# Patient Record
Sex: FEMALE | Race: BLACK OR AFRICAN AMERICAN | URBAN - METROPOLITAN AREA
[De-identification: names, ages, dates, MRNs, and addresses within clinical notes are randomized per-mention and may not be internally consistent; named-entity substitution may affect disease eponyms.]

---

## 2017-06-15 ENCOUNTER — EMERGENCY (EMERGENCY)
Facility: HOSPITAL | Age: 32
LOS: 0 days | Discharge: HOME | End: 2017-06-15
Admitting: INTERNAL MEDICINE

## 2017-06-15 DIAGNOSIS — J98.59 OTHER DISEASES OF MEDIASTINUM, NOT ELSEWHERE CLASSIFIED: ICD-10-CM

## 2017-06-26 PROBLEM — Z00.00 ENCOUNTER FOR PREVENTIVE HEALTH EXAMINATION: Status: ACTIVE | Noted: 2017-06-26

## 2017-06-28 DIAGNOSIS — M25.562 PAIN IN LEFT KNEE: ICD-10-CM

## 2017-06-28 DIAGNOSIS — M25.541 PAIN IN JOINTS OF RIGHT HAND: ICD-10-CM

## 2017-06-28 DIAGNOSIS — Z98.890 OTHER SPECIFIED POSTPROCEDURAL STATES: ICD-10-CM

## 2017-06-28 DIAGNOSIS — M25.512 PAIN IN LEFT SHOULDER: ICD-10-CM

## 2017-06-28 DIAGNOSIS — M25.542 PAIN IN JOINTS OF LEFT HAND: ICD-10-CM

## 2017-06-28 DIAGNOSIS — M25.561 PAIN IN RIGHT KNEE: ICD-10-CM

## 2017-09-13 ENCOUNTER — APPOINTMENT (OUTPATIENT)
Dept: RHEUMATOLOGY | Facility: CLINIC | Age: 32
End: 2017-09-13

## 2018-08-15 ENCOUNTER — EMERGENCY (EMERGENCY)
Facility: HOSPITAL | Age: 33
LOS: 0 days | Discharge: HOME | End: 2018-08-15
Attending: EMERGENCY MEDICINE | Admitting: EMERGENCY MEDICINE

## 2018-08-15 VITALS
OXYGEN SATURATION: 100 % | TEMPERATURE: 98 F | HEART RATE: 85 BPM | RESPIRATION RATE: 18 BRPM | SYSTOLIC BLOOD PRESSURE: 133 MMHG | DIASTOLIC BLOOD PRESSURE: 56 MMHG

## 2018-08-15 DIAGNOSIS — G43.909 MIGRAINE, UNSPECIFIED, NOT INTRACTABLE, WITHOUT STATUS MIGRAINOSUS: ICD-10-CM

## 2018-08-15 DIAGNOSIS — R51 HEADACHE: ICD-10-CM

## 2018-08-15 RX ORDER — SODIUM CHLORIDE 9 MG/ML
3 INJECTION INTRAMUSCULAR; INTRAVENOUS; SUBCUTANEOUS ONCE
Qty: 0 | Refills: 0 | Status: COMPLETED | OUTPATIENT
Start: 2018-08-15 | End: 2018-08-15

## 2018-08-15 RX ORDER — METOCLOPRAMIDE HCL 10 MG
1 TABLET ORAL
Qty: 20 | Refills: 0
Start: 2018-08-15 | End: 2018-08-19

## 2018-08-15 RX ORDER — KETOROLAC TROMETHAMINE 30 MG/ML
30 SYRINGE (ML) INJECTION ONCE
Qty: 0 | Refills: 0 | Status: DISCONTINUED | OUTPATIENT
Start: 2018-08-15 | End: 2018-08-15

## 2018-08-15 RX ORDER — METOCLOPRAMIDE HCL 10 MG
10 TABLET ORAL ONCE
Qty: 0 | Refills: 0 | Status: COMPLETED | OUTPATIENT
Start: 2018-08-15 | End: 2018-08-15

## 2018-08-15 RX ORDER — SODIUM CHLORIDE 9 MG/ML
1000 INJECTION INTRAMUSCULAR; INTRAVENOUS; SUBCUTANEOUS ONCE
Qty: 0 | Refills: 0 | Status: COMPLETED | OUTPATIENT
Start: 2018-08-15 | End: 2018-08-15

## 2018-08-15 RX ADMIN — Medication 10 MILLIGRAM(S): at 12:31

## 2018-08-15 RX ADMIN — SODIUM CHLORIDE 1000 MILLILITER(S): 9 INJECTION INTRAMUSCULAR; INTRAVENOUS; SUBCUTANEOUS at 12:31

## 2018-08-15 RX ADMIN — Medication 30 MILLIGRAM(S): at 12:31

## 2018-08-15 RX ADMIN — SODIUM CHLORIDE 3 MILLILITER(S): 9 INJECTION INTRAMUSCULAR; INTRAVENOUS; SUBCUTANEOUS at 12:20

## 2018-08-15 NOTE — ED PROVIDER NOTE - MEDICAL DECISION MAKING DETAILS
I have discussed the discharge plan with the patient. The patient agrees with the plan, as discussed.  The patient understands Emergency Department diagnosis is a preliminary diagnosis often based on limited information and that the patient must adhere to the follow-up plan as discussed.  The patient understands that if the symptoms worsen or if prescribed medications do not have the desired/planned effect that the patient may return to the Emergency Department at any time for further evaluation and treatment. I have discussed the discharge plan with the patient. The patient agrees with the plan, as discussed.  The patient understands Emergency Department diagnosis is a preliminary diagnosis often based on limited information and that the patient must adhere to the follow-up plan as discussed.  The patient understands that if the symptoms worsen or if prescribed medications do not have the desired/planned effect that the patient may return to the Emergency Department at any time for further evaluation and treatment. Pt improved. Pt advised to follow up with neurology for further care.

## 2018-08-15 NOTE — ED ADULT NURSE NOTE - PMH
Intractable migraine with status migrainosus, unspecified migraine type    Migraine without status migrainosus, not intractable, unspecified migraine type

## 2018-08-15 NOTE — ED PROVIDER NOTE - PROGRESS NOTE DETAILS
Pt refusing to have Upreg done, states she is not pregnant as she just had her cycle last week. Pt reports headache improved after medications.  IVF running.  Will continue to monitor and reasses. Pt insisting on having CT Scan.  Pt now agrees to give urine.  UPreg negative.  CT head ordered. Pt symptoms improved.  CT scan negative.  Will dc home with reglan and NSAIDS.

## 2018-08-15 NOTE — ED PROVIDER NOTE - ATTENDING CONTRIBUTION TO CARE
Pt has a long history of migraine headaches. Headaches always start gradually. This past Saturday pt noted a gradual onset headache, however it persisted since then. Pt tried Excedrin, but this has not helped. On exam neuro intact. Speech is clear. S!s2 rrr,

## 2018-08-15 NOTE — ED PROVIDER NOTE - PHYSICAL EXAMINATION
CONSTITUTIONAL: Well-developed; well-nourished; in no acute distress, nontoxic appearing  SKIN: skin exam is warm and dry,  HEAD: Normocephalic; atraumatic.  EYES: PERRL, 3 mm bilateral, no nystagmus, EOM intact; conjunctiva and sclera clear.  ENT: MMM, no nasal congestion  NECK: Supple; non tender.+ full passive ROM in all directions. No JVD, no neck pain, no nuchal rigidity  CARD: S1, S2 normal, no murmur  RESP: No wheezes, rales or rhonchi. Good air movement bilaterally  ABD: soft; non-distended; non-tender. No Rebound, No guarding  EXT: Normal ROM. No cyanosis or edema. Dp Pulses intact.   NEURO: awake, alert, following commands, oriented, grossly unremarkable. No Focal deficits. GCS 15.   PSYCH: Cooperative, appropriate.

## 2018-08-15 NOTE — ED PROVIDER NOTE - NS ED ROS FT
Constitutional:  no fevers, no chills, no malaise  Eyes:  No visual changes  ENMT: No neck pain or stiffness, no nasal congestion, no ear pain, no throat pain  Cardiac:  No chest pain  Respiratory:  No cough or sob  GI:  No nausea, vomiting, diarrhea or abdominal pain.  :  No dysuria, frequency or burning.  MS:  No back pain, no joint pain.  Neuro:  + headache; no dizziness, no change in mental status  Skin:  No skin rash  Except as documented in the HPI,  all other systems are negative

## 2018-08-15 NOTE — ED PROVIDER NOTE - OBJECTIVE STATEMENT
33 y.o. female with PMH of Migraine's presents to the ED c/o left sided headache x 3 days.  Pt state she has been taking Excedrin, which normally help but they have not been helping currently.  Pt states she has had headaches worse than this in the past.  Pt denies any fever, chills, n/v/d, neck pain, back pain, chest pain, visual complaints or any other complaints. 33 y.o. female with PMH of Migraine's presents to the ED c/o left sided headache x 3 days.  Pt state she has been taking Excedrin, which normally help but they have not been helping currently.  Pt states she has had headaches worse than this in the past.  Pt denies any fever, chills, n/v/d, neck pain, back pain, chest pain, visual complaints or any other complaints.  LMP last week.

## 2019-01-01 ENCOUNTER — OUTPATIENT (OUTPATIENT)
Dept: OUTPATIENT SERVICES | Facility: HOSPITAL | Age: 34
LOS: 1 days | End: 2019-01-01
Payer: COMMERCIAL

## 2019-01-01 PROCEDURE — G9001: CPT

## 2019-01-12 ENCOUNTER — EMERGENCY (EMERGENCY)
Facility: HOSPITAL | Age: 34
LOS: 0 days | Discharge: HOME | End: 2019-01-12
Attending: EMERGENCY MEDICINE

## 2019-01-12 VITALS
HEART RATE: 78 BPM | DIASTOLIC BLOOD PRESSURE: 64 MMHG | OXYGEN SATURATION: 98 % | SYSTOLIC BLOOD PRESSURE: 108 MMHG | RESPIRATION RATE: 18 BRPM | TEMPERATURE: 97 F

## 2019-01-12 VITALS
SYSTOLIC BLOOD PRESSURE: 107 MMHG | DIASTOLIC BLOOD PRESSURE: 64 MMHG | OXYGEN SATURATION: 99 % | HEART RATE: 76 BPM | RESPIRATION RATE: 18 BRPM | TEMPERATURE: 98 F

## 2019-01-12 DIAGNOSIS — R11.0 NAUSEA: ICD-10-CM

## 2019-01-12 DIAGNOSIS — Z79.899 OTHER LONG TERM (CURRENT) DRUG THERAPY: ICD-10-CM

## 2019-01-12 DIAGNOSIS — R10.12 LEFT UPPER QUADRANT PAIN: ICD-10-CM

## 2019-01-12 DIAGNOSIS — R63.4 ABNORMAL WEIGHT LOSS: ICD-10-CM

## 2019-01-12 DIAGNOSIS — R10.9 UNSPECIFIED ABDOMINAL PAIN: ICD-10-CM

## 2019-01-12 DIAGNOSIS — R10.13 EPIGASTRIC PAIN: ICD-10-CM

## 2019-01-12 DIAGNOSIS — Z79.1 LONG TERM (CURRENT) USE OF NON-STEROIDAL ANTI-INFLAMMATORIES (NSAID): ICD-10-CM

## 2019-01-12 PROBLEM — G43.909 MIGRAINE, UNSPECIFIED, NOT INTRACTABLE, WITHOUT STATUS MIGRAINOSUS: Chronic | Status: ACTIVE | Noted: 2018-08-15

## 2019-01-12 PROBLEM — G43.911 MIGRAINE, UNSPECIFIED, INTRACTABLE, WITH STATUS MIGRAINOSUS: Chronic | Status: ACTIVE | Noted: 2018-08-15

## 2019-01-12 LAB
ALBUMIN SERPL ELPH-MCNC: 4.2 G/DL — SIGNIFICANT CHANGE UP (ref 3.5–5.2)
ALP SERPL-CCNC: 89 U/L — SIGNIFICANT CHANGE UP (ref 30–115)
ALT FLD-CCNC: 17 U/L — SIGNIFICANT CHANGE UP (ref 0–41)
ANION GAP SERPL CALC-SCNC: 14 MMOL/L — SIGNIFICANT CHANGE UP (ref 7–14)
APPEARANCE UR: ABNORMAL
AST SERPL-CCNC: 27 U/L — SIGNIFICANT CHANGE UP (ref 0–41)
BASOPHILS # BLD AUTO: 0.02 K/UL — SIGNIFICANT CHANGE UP (ref 0–0.2)
BASOPHILS NFR BLD AUTO: 0.4 % — SIGNIFICANT CHANGE UP (ref 0–1)
BILIRUB SERPL-MCNC: 0.5 MG/DL — SIGNIFICANT CHANGE UP (ref 0.2–1.2)
BILIRUB UR-MCNC: NEGATIVE — SIGNIFICANT CHANGE UP
BUN SERPL-MCNC: 10 MG/DL — SIGNIFICANT CHANGE UP (ref 10–20)
CALCIUM SERPL-MCNC: 9.3 MG/DL — SIGNIFICANT CHANGE UP (ref 8.5–10.1)
CHLORIDE SERPL-SCNC: 99 MMOL/L — SIGNIFICANT CHANGE UP (ref 98–110)
CO2 SERPL-SCNC: 23 MMOL/L — SIGNIFICANT CHANGE UP (ref 17–32)
COLOR SPEC: YELLOW — SIGNIFICANT CHANGE UP
CREAT SERPL-MCNC: 0.7 MG/DL — SIGNIFICANT CHANGE UP (ref 0.7–1.5)
DIFF PNL FLD: NEGATIVE — SIGNIFICANT CHANGE UP
EOSINOPHIL # BLD AUTO: 0.08 K/UL — SIGNIFICANT CHANGE UP (ref 0–0.7)
EOSINOPHIL NFR BLD AUTO: 1.8 % — SIGNIFICANT CHANGE UP (ref 0–8)
GLUCOSE SERPL-MCNC: 72 MG/DL — SIGNIFICANT CHANGE UP (ref 70–99)
GLUCOSE UR QL: NEGATIVE MG/DL — SIGNIFICANT CHANGE UP
HCG SERPL QL: NEGATIVE — SIGNIFICANT CHANGE UP
HCT VFR BLD CALC: 38.7 % — SIGNIFICANT CHANGE UP (ref 37–47)
HGB BLD-MCNC: 12.9 G/DL — SIGNIFICANT CHANGE UP (ref 12–16)
IMM GRANULOCYTES NFR BLD AUTO: 0 % — LOW (ref 0.1–0.3)
KETONES UR-MCNC: NEGATIVE — SIGNIFICANT CHANGE UP
LACTATE SERPL-SCNC: 0.9 MMOL/L — SIGNIFICANT CHANGE UP (ref 0.5–2.2)
LEUKOCYTE ESTERASE UR-ACNC: NEGATIVE — SIGNIFICANT CHANGE UP
LIDOCAIN IGE QN: 38 U/L — SIGNIFICANT CHANGE UP (ref 7–60)
LYMPHOCYTES # BLD AUTO: 2.28 K/UL — SIGNIFICANT CHANGE UP (ref 1.2–3.4)
LYMPHOCYTES # BLD AUTO: 49.9 % — SIGNIFICANT CHANGE UP (ref 20.5–51.1)
MCHC RBC-ENTMCNC: 28.7 PG — SIGNIFICANT CHANGE UP (ref 27–31)
MCHC RBC-ENTMCNC: 33.3 G/DL — SIGNIFICANT CHANGE UP (ref 32–37)
MCV RBC AUTO: 86 FL — SIGNIFICANT CHANGE UP (ref 81–99)
MONOCYTES # BLD AUTO: 0.42 K/UL — SIGNIFICANT CHANGE UP (ref 0.1–0.6)
MONOCYTES NFR BLD AUTO: 9.2 % — SIGNIFICANT CHANGE UP (ref 1.7–9.3)
NEUTROPHILS # BLD AUTO: 1.77 K/UL — SIGNIFICANT CHANGE UP (ref 1.4–6.5)
NEUTROPHILS NFR BLD AUTO: 38.7 % — LOW (ref 42.2–75.2)
NITRITE UR-MCNC: NEGATIVE — SIGNIFICANT CHANGE UP
NRBC # BLD: 0 /100 WBCS — SIGNIFICANT CHANGE UP (ref 0–0)
PH UR: 6.5 — SIGNIFICANT CHANGE UP (ref 5–8)
PLATELET # BLD AUTO: 259 K/UL — SIGNIFICANT CHANGE UP (ref 130–400)
POTASSIUM SERPL-MCNC: 4.5 MMOL/L — SIGNIFICANT CHANGE UP (ref 3.5–5)
POTASSIUM SERPL-SCNC: 4.5 MMOL/L — SIGNIFICANT CHANGE UP (ref 3.5–5)
PROT SERPL-MCNC: 7 G/DL — SIGNIFICANT CHANGE UP (ref 6–8)
PROT UR-MCNC: NEGATIVE MG/DL — SIGNIFICANT CHANGE UP
RBC # BLD: 4.5 M/UL — SIGNIFICANT CHANGE UP (ref 4.2–5.4)
RBC # FLD: 13.5 % — SIGNIFICANT CHANGE UP (ref 11.5–14.5)
SODIUM SERPL-SCNC: 136 MMOL/L — SIGNIFICANT CHANGE UP (ref 135–146)
SP GR SPEC: 1.01 — SIGNIFICANT CHANGE UP (ref 1.01–1.03)
UROBILINOGEN FLD QL: 0.2 MG/DL — SIGNIFICANT CHANGE UP (ref 0.2–0.2)
WBC # BLD: 4.57 K/UL — LOW (ref 4.8–10.8)
WBC # FLD AUTO: 4.57 K/UL — LOW (ref 4.8–10.8)

## 2019-01-12 RX ORDER — ONDANSETRON 8 MG/1
4 TABLET, FILM COATED ORAL ONCE
Qty: 0 | Refills: 0 | Status: COMPLETED | OUTPATIENT
Start: 2019-01-12 | End: 2019-01-12

## 2019-01-12 RX ORDER — FAMOTIDINE 10 MG/ML
20 INJECTION INTRAVENOUS ONCE
Qty: 0 | Refills: 0 | Status: COMPLETED | OUTPATIENT
Start: 2019-01-12 | End: 2019-01-12

## 2019-01-12 RX ORDER — SODIUM CHLORIDE 9 MG/ML
1000 INJECTION, SOLUTION INTRAVENOUS ONCE
Qty: 0 | Refills: 0 | Status: COMPLETED | OUTPATIENT
Start: 2019-01-12 | End: 2019-01-12

## 2019-01-12 RX ADMIN — FAMOTIDINE 20 MILLIGRAM(S): 10 INJECTION INTRAVENOUS at 14:00

## 2019-01-12 RX ADMIN — SODIUM CHLORIDE 1000 MILLILITER(S): 9 INJECTION, SOLUTION INTRAVENOUS at 14:00

## 2019-01-12 RX ADMIN — ONDANSETRON 4 MILLIGRAM(S): 8 TABLET, FILM COATED ORAL at 14:00

## 2019-01-12 NOTE — CONSULT NOTE ADULT - SUBJECTIVE AND OBJECTIVE BOX
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 Pt also reports intermittent throbbing left upper quadrant abdominal pain x 3 weeks states pain is similar to pain she had prior to being diagnosed with mediastinal mass. Reports weight loss over the past 2 weeks.    PAST MEDICAL & SURGICAL HISTORY:  Intractable migraine with status migrainosus, unspecified migraine type  Migraine without status migrainosus, not intractable, unspecified migraine type  No significant past surgical history        MEDICATIONS  (STANDING):    MEDICATIONS  (PRN):      Allergies    No Known Allergies    Intolerances        REVIEW OF SYSTEMS    [ ] A ten-point review of systems was otherwise negative except as noted.  [ ] Due to altered mental status/intubation, subjective information were not able to be obtained from the patient. History was obtained, to the extent possible, from review of the chart and collateral sources of information.      Vital Signs Last 24 Hrs  T(C): 36.2 (12 Jan 2019 12:56), Max: 36.2 (12 Jan 2019 12:56)  T(F): 97.1 (12 Jan 2019 12:56), Max: 97.1 (12 Jan 2019 12:56)  HR: 78 (12 Jan 2019 12:56) (78 - 78)  BP: 108/64 (12 Jan 2019 12:56) (108/64 - 108/64)  BP(mean): --  RR: 18 (12 Jan 2019 12:56) (18 - 18)  SpO2: 98% (12 Jan 2019 12:56) (98% - 98%)    PHYSICAL EXAM:  GENERAL: NAD, well-appearing  CHEST/LUNG: Clear to auscultation bilaterally  HEART: Regular rate and rhythm  ABDOMEN: Soft, Nontender, Nondistended;   EXTREMITIES:  No clubbing, cyanosis, or edema      LABS:  Labs:  CAPILLARY BLOOD GLUCOSE                              12.9   4.57  )-----------( 259      ( 12 Jan 2019 13:35 )             38.7       Auto Immature Granulocyte %: 0.0 % (01-12-19 @ 13:35)  Auto Neutrophil %: 38.7 % (01-12-19 @ 13:35)    01-12    136  |  99  |  10  ----------------------------<  72  4.5   |  23  |  0.7      Calcium, Total Serum: 9.3 mg/dL (01-12-19 @ 13:35)      LFTs:             7.0  | 0.5  | 27       ------------------[89      ( 12 Jan 2019 13:35 )  4.2  | x    | 17          Lipase:38     Amylase:x         Lactate, Blood: 0.9 mmol/L (01-12-19 @ 13:35)      Coags:          RADIOLOGY & ADDITIONAL STUDIES: QUIRINO TYE [id number]  33y Female      HPI:  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 Pt also reports intermittent throbbing left upper quadrant abdominal pain x 3 weeks states pain is similar to pain she had prior to being diagnosed with mediastinal mass. Reports weight loss over the past 2 weeks.    PAST MEDICAL & SURGICAL HISTORY:  Intractable migraine with status migrainosus, unspecified migraine type  Migraine without status migrainosus, not intractable, unspecified migraine type  No significant past surgical history        MEDICATIONS  (STANDING):    MEDICATIONS  (PRN):      Allergies    No Known Allergies    Intolerances        REVIEW OF SYSTEMS    [ ] A ten-point review of systems was otherwise negative except as noted.  [ ] Due to altered mental status/intubation, subjective information were not able to be obtained from the patient. History was obtained, to the extent possible, from review of the chart and collateral sources of information.      Vital Signs Last 24 Hrs  T(C): 36.2 (12 Jan 2019 12:56), Max: 36.2 (12 Jan 2019 12:56)  T(F): 97.1 (12 Jan 2019 12:56), Max: 97.1 (12 Jan 2019 12:56)  HR: 78 (12 Jan 2019 12:56) (78 - 78)  BP: 108/64 (12 Jan 2019 12:56) (108/64 - 108/64)  BP(mean): --  RR: 18 (12 Jan 2019 12:56) (18 - 18)  SpO2: 98% (12 Jan 2019 12:56) (98% - 98%)    PHYSICAL EXAM:  GENERAL: NAD, well-appearing  CHEST/LUNG: Clear to auscultation bilaterally  HEART: Regular rate and rhythm  ABDOMEN: Soft, Nontender, Nondistended;   EXTREMITIES:  No clubbing, cyanosis, or edema      LABS:  Labs:  CAPILLARY BLOOD GLUCOSE                              12.9   4.57  )-----------( 259      ( 12 Jan 2019 13:35 )             38.7       Auto Immature Granulocyte %: 0.0 % (01-12-19 @ 13:35)  Auto Neutrophil %: 38.7 % (01-12-19 @ 13:35)    01-12    136  |  99  |  10  ----------------------------<  72  4.5   |  23  |  0.7      Calcium, Total Serum: 9.3 mg/dL (01-12-19 @ 13:35)      LFTs:             7.0  | 0.5  | 27       ------------------[89      ( 12 Jan 2019 13:35 )  4.2  | x    | 17          Lipase:38     Amylase:x         Lactate, Blood: 0.9 mmol/L (01-12-19 @ 13:35)      Coags:          RADIOLOGY & ADDITIONAL STUDIES:  < from: CT Chest w/ IV Cont (01.12.19 @ 16:09) >  1. No CT evidence of intrathoracic or intra-abdominal pathology.  2. Post resection of posterior cystic mediastinal mass.    < end of copied text >

## 2019-01-12 NOTE — ED PROVIDER NOTE - PHYSICAL EXAMINATION
VITAL SIGNS: I have reviewed nursing notes and confirm.  CONSTITUTIONAL: Well-developed; well-nourished; in no acute distress.  SKIN: Skin exam is warm and dry, no acute rash.  HEAD: Normocephalic; atraumatic.  EYES: PERRL, EOM intact; conjunctiva and sclera clear.  ENT: No nasal discharge; airway clear.   NECK: Supple; non tender.  CARD: S1, S2 normal; no murmurs, gallops, or rubs. Regular rate and rhythm.  RESP: Clear to auscultation bilaterally. No wheezes, rales or rhonchi.  ABD: Normal bowel sounds; soft; non-distended; +epigastric tenderness. No rebound tenderness or guarding.   EXT: Normal ROM. No edema.  LYMPH: No acute cervical adenopathy.  NEURO: Alert, oriented. Grossly unremarkable. No focal deficits.  PSYCH: Cooperative, appropriate.

## 2019-01-12 NOTE — ED PROVIDER NOTE - PROGRESS NOTE DETAILS
CT surgery consulted spoke to resident [address] Ct surgery recommending CT Chest to assess for any new growth. Discussed results with patient. Advised PMD, GI, and cardiothoracic surgery follow up. Discussed strict return precautions patient verbalized understanding.

## 2019-01-12 NOTE — ED PROVIDER NOTE - OBJECTIVE STATEMENT
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 Pt also reports intermittent throbbing left upper quadrant abdominal pain x 3 weeks states pain is similar to pain she had prior to being diagnosed with mediastinal mass. Reports weight loss over the past 2 weeks. No cp, sob, fever, chills, vomiting, diarrhea, back pain, urinary symptoms, headache, dizziness,  paresthesias, or weakness. 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 Pt also reports intermittent throbbing left upper quadrant abdominal pain x 3 weeks states pain is similar to pain she had prior to being diagnosed with mediastinal mass. Reports weight loss over the past 2 weeks. No cp, sob, fever, chills, vomiting, diarrhea, back pain, urinary symptoms, headache, dizziness,  paresthesias, or weakness.

## 2019-01-12 NOTE — CONSULT NOTE ADULT - ASSESSMENT
34 yo f with luq pain     -ct chest  - labs  -d/w Dr Overton 32 yo f with luq pain     -ct chest neg  - labs wnl  - no role for surgical intervention  -d/w Dr Overton

## 2019-01-12 NOTE — ED ADULT NURSE NOTE - NSIMPLEMENTINTERV_GEN_ALL_ED
Implemented All Universal Safety Interventions:  [place] to call system. Call bell, personal items and telephone within reach. Instruct patient to call for assistance. Room bathroom lighting operational. Non-slip footwear when patient is off stretcher. Physically safe environment: no spills, clutter or unnecessary equipment. Stretcher in lowest position, wheels locked, appropriate side rails in place.

## 2019-01-12 NOTE — ED PROVIDER NOTE - CARE PROVIDERS DIRECT ADDRESSES
,DirectAddress_Unknown,[email or web address],DirectAddress_Unknown

## 2019-01-12 NOTE — ED ADULT TRIAGE NOTE - CHIEF COMPLAINT QUOTE
Left upper abdominal pain for 2 weeks worse since yesterday. C/o diarrhea and recent 20lb weight loss.

## 2019-01-12 NOTE — ED PROVIDER NOTE - PROVIDER TOKENS
TOKEN:'[id number]:'[id number]',FREE:[LAST:[Your primary care provider],PHONE:[(   )    -],FAX:[(   )    -]]

## 2019-01-12 NOTE — ED PROVIDER NOTE - ATTENDING CONTRIBUTION TO CARE
33y f h/o ovarian cysts, VATS for resection of thoracic mediastinal mast in Feb 2015 by CT Sx Dr. Hoyos (bx=mullerian cyst) p/w LUQ pain x 2 wks. Similar to sx felt when she was dx w/mediastinal mass in 2014 (via MRI of t-spine). Now also w/constant squeezing epigastric pain radiating to back accomp by nausea & loose stools x 2d. Also rpts wt loss over last 2 wks. Denies f/c, night sweats, cp/sob, vomiting, melena, brbpr, flank pain, urinary sx, rash. No sick contacts, recent travel or abx. PE: young f wdwn nad, ncat, neck supple, rrr nl s1s2 no mrg, ctab no wrr, abd soft +epigastric ttp rest non-tender no palpable masses no rgr, no cvat, ext no cce dpi. 33y f h/o ovarian cysts, migrains, VATS for resection of thoracic mediastinal mast in Feb 2015 by CT Sx Dr. Hoyos (bx=mullerian cyst) p/w LUQ pain x 2 wks. Similar to sx felt when she was dx w/mediastinal mass in 2014 (via MRI of t-spine). Now also w/constant squeezing epigastric pain radiating to back accomp by nausea & loose stools x 2d. Also rpts wt loss over last 2 wks. Denies f/c, night sweats, cp/sob, vomiting, melena, brbpr, flank pain, urinary sx, rash. No sick contacts, recent travel or abx. PE: young f wdwn nad, ncat, neck supple, rrr nl s1s2 no mrg, ctab no wrr, abd soft +epigastric ttp rest non-tender no palpable masses no rgr, no cvat, ext no cce dpi.

## 2019-01-12 NOTE — ED PROVIDER NOTE - MEDICAL DECISION MAKING DETAILS
LUQ/epigastric pain, h/o benign mediastinal mass s/o resection 2015 - ekg/cxr/labs/CT AP nl, CT Sx consulted who rec CT chest w/IV also nl - all results d/w pt, return precautions discussed, encouraged outpt GI & CT Sx f/u

## 2019-01-12 NOTE — ED PROVIDER NOTE - CARE PROVIDER_API CALL
Jose Hoyos), Surgery; Thoracic and Cardiac Surgery  [address] [facility]  [address]  [place] [address]  Phone: [phone]  Fax: [phone]    Vilma Fraire), Internal Medicine  [address] [place] [address]  Phone: [phone]  Fax: [phone]    Your primary care provider,   Phone: (   )    -  Fax: (   )    -

## 2019-01-16 DIAGNOSIS — Z71.89 OTHER SPECIFIED COUNSELING: ICD-10-CM

## 2019-03-19 ENCOUNTER — APPOINTMENT (OUTPATIENT)
Dept: CARDIOTHORACIC SURGERY | Facility: CLINIC | Age: 34
End: 2019-03-19

## 2019-04-02 ENCOUNTER — APPOINTMENT (OUTPATIENT)
Dept: CARDIOTHORACIC SURGERY | Facility: CLINIC | Age: 34
End: 2019-04-02

## 2019-04-02 VITALS
HEART RATE: 73 BPM | WEIGHT: 140 LBS | RESPIRATION RATE: 12 BRPM | DIASTOLIC BLOOD PRESSURE: 69 MMHG | HEIGHT: 65 IN | TEMPERATURE: 98.2 F | BODY MASS INDEX: 23.32 KG/M2 | OXYGEN SATURATION: 98 % | SYSTOLIC BLOOD PRESSURE: 109 MMHG

## 2019-04-02 DIAGNOSIS — N83.201 UNSPECIFIED OVARIAN CYST, RIGHT SIDE: ICD-10-CM

## 2019-04-02 DIAGNOSIS — Q34.1 CONGENITAL CYST OF MEDIASTINUM: ICD-10-CM

## 2019-04-02 DIAGNOSIS — N83.202 UNSPECIFIED OVARIAN CYST, RIGHT SIDE: ICD-10-CM

## 2019-04-02 DIAGNOSIS — Z80.9 FAMILY HISTORY OF MALIGNANT NEOPLASM, UNSPECIFIED: ICD-10-CM

## 2019-04-02 DIAGNOSIS — R07.1 CHEST PAIN ON BREATHING: ICD-10-CM

## 2019-04-02 RX ORDER — IBUPROFEN 400 MG/1
400 TABLET, FILM COATED ORAL 3 TIMES DAILY
Qty: 21 | Refills: 0 | Status: ACTIVE | COMMUNITY
Start: 2019-04-02

## 2019-04-02 RX ORDER — NAPROXEN SODIUM 220 MG
TABLET ORAL
Refills: 0 | Status: DISCONTINUED | COMMUNITY
End: 2019-04-02

## 2019-04-02 NOTE — REASON FOR VISIT
[Follow-Up: _____] : a [unfilled] follow-up visit [FreeTextEntry1] : Left thoracoscopy (VATS) and resection of posterior mediastinal cyst  2/06/2015 with Dr. Hoyos

## 2019-04-02 NOTE — REVIEW OF SYSTEMS
[Negative] : Psychiatric [Fever] : no fever [Chills] : no chills [Feeling Poorly] : not feeling poorly [Feeling Tired] : not feeling tired [Heart Rate Is Fast] : the heart rate was not fast [Chest Pain] : no chest pain [Palpitations] : no palpitations [Lower Ext Edema] : no lower extremity edema [Shortness Of Breath] : no shortness of breath [Wheezing] : no wheezing [Cough] : no cough [SOB on Exertion] : no shortness of breath during exertion [Abdominal Pain] : no abdominal pain [Vomiting] : no vomiting [Constipation] : no constipation [Diarrhea] : no diarrhea [Confused] : no confusion [Convulsions] : no convulsions [Dizziness] : no dizziness [Fainting] : no fainting [Limb Weakness] : no limb weakness [Difficulty Walking] : no difficulty walking [FreeTextEntry6] : pain on inspiration at times

## 2019-04-02 NOTE — PHYSICAL EXAM
[Sclera] : the sclera and conjunctiva were normal [Strabismus] : no strabismus was seen [Neck Appearance] : the appearance of the neck was normal [Neck Cervical Mass (___cm)] : no neck mass was observed [Jugular Venous Distention Increased] : there was no jugular-venous distention [Respiration, Rhythm And Depth] : normal respiratory rhythm and effort [Exaggerated Use Of Accessory Muscles For Inspiration] : no accessory muscle use [Auscultation Breath Sounds / Voice Sounds] : lungs were clear to auscultation bilaterally [Heart Rate And Rhythm] : heart rate was normal and rhythm regular [Heart Sounds] : normal S1 and S2 [Heart Sounds Gallop] : no gallops [Murmurs] : no murmurs [Heart Sounds Pericardial Friction Rub] : no pericardial rub [Examination Of The Chest] : the chest was normal in appearance [Diminished Respiratory Excursion] : normal chest expansion [Abdomen Soft] : soft [Abdomen Tenderness] : non-tender [No CVA Tenderness] : no ~M costovertebral angle tenderness [Abnormal Walk] : normal gait [Nail Clubbing] : no clubbing  or cyanosis of the fingernails [Involuntary Movements] : no involuntary movements were seen [Musculoskeletal - Swelling] : no joint swelling seen [Motor Tone] : muscle strength and tone were normal [Skin Color & Pigmentation] : normal skin color and pigmentation [Skin Turgor] : normal skin turgor [] : no rash [Skin Lesions] : no skin lesions [Cranial Nerves] : cranial nerves 2-12 were intact [Oriented To Time, Place, And Person] : oriented to person, place, and time [Impaired Insight] : insight and judgment were intact [Affect] : the affect was normal [Mood] : the mood was normal [FreeTextEntry1] : left upper back - scar healed well

## 2019-04-02 NOTE — ASSESSMENT
[FreeTextEntry1] : Possible Pleural pain, no distress, clinically stable.  Dr. Hoyos reviewed CT images, no findings indicated.  Pt instructed to take Motrin 400 mg Q 8 hrs X  7 days, as needed.  Pt also instructed to follow up with a PCP.  RTO as needed.  All questions answered by Dr. Hoyos.

## 2019-04-02 NOTE — HISTORY OF PRESENT ILLNESS
[Heart Failure within 2 Weeks] : Heart Failure in last 2 weeks [FreeTextEntry1] : Ms. Medina presents today for recent left rib pain and left flank/back started approx in 1/2019.   Pt seen in ED and had CT chest/abd/pelvis indicating post resection of posterior cystic mediastinal mass.    [Diabetes Mellitus] : no Diabetes Melllitus [Dyslipidemia] : no dyslipidemia [Home Oxygen] : no home oxygen use [Liver Disease] : no liver disease [Unresponsive Neurologic State] : not in a unresponsive neurologic state [Cerebrovascular Disease] : no cerebrovascular disease [Prior Heart Failure] : no prior heart failure

## 2019-11-24 ENCOUNTER — EMERGENCY (EMERGENCY)
Facility: HOSPITAL | Age: 34
LOS: 0 days | Discharge: HOME | End: 2019-11-24
Admitting: EMERGENCY MEDICINE
Payer: COMMERCIAL

## 2019-11-24 VITALS
OXYGEN SATURATION: 99 % | SYSTOLIC BLOOD PRESSURE: 119 MMHG | HEART RATE: 83 BPM | TEMPERATURE: 98 F | RESPIRATION RATE: 90 BRPM | DIASTOLIC BLOOD PRESSURE: 79 MMHG

## 2019-11-24 DIAGNOSIS — M54.9 DORSALGIA, UNSPECIFIED: ICD-10-CM

## 2019-11-24 DIAGNOSIS — M54.6 PAIN IN THORACIC SPINE: ICD-10-CM

## 2019-11-24 PROCEDURE — 71046 X-RAY EXAM CHEST 2 VIEWS: CPT | Mod: 26

## 2019-11-24 PROCEDURE — 99283 EMERGENCY DEPT VISIT LOW MDM: CPT

## 2019-11-24 RX ORDER — METHOCARBAMOL 500 MG/1
1 TABLET, FILM COATED ORAL
Qty: 21 | Refills: 0
Start: 2019-11-24 | End: 2019-11-30

## 2019-11-24 NOTE — ED PROVIDER NOTE - NS ED ROS FT
Review of Systems    Constitutional: (-) fever, (-) chills  Eyes/ENT: (-) blurry vision, (-) epistaxis, (-) sore throat  Cardiovascular: (-) chest pain, (-) syncope  Respiratory: (-) cough, (-) shortness of breath  Gastrointestinal: (-) pain, (-) nausea, (-) vomiting, (-) diarrhea  Musculoskeletal: (-) neck pain, (+) back pain, (-) joint pain  Integumentary: (-) rash, (-) edema  Neurological: (-) headache, (-) altered mental status  Psychiatric: (-) hallucinations  Allergic/Immunologic: (-) pruritus

## 2019-11-24 NOTE — ED PROVIDER NOTE - PATIENT PORTAL LINK FT
You can access the FollowMyHealth Patient Portal offered by [facility] by registering at the following website: [email or web address]. By joining [employer]’s FollowMyHealth portal, you will also be able to view your health information using other applications (apps) compatible with our system.

## 2019-11-24 NOTE — ED PROVIDER NOTE - PHYSICAL EXAMINATION
Gen: Alert, NAD, well appearing  Head: NC, AT, PERRL, EOMI, normal lids/conjunctiva  ENT: normal hearing, patent oropharynx   Neck: +supple, no tenderness/meningismus,  Pulm: Bilateral BS, normal resp effort, no wheeze/stridor/retractions  CV: RRR, no murmer  Abd: soft, NT/ND  Mskel: NT to palpation of back. + pain to right upper back with movement of back and right arm. +  pain with inspiration. No edema/erythema/cyanosis. No leg pains or leg swelling  Skin: no rash, warm/dry.   Neuro: AAOx3, no sensory/motor deficits

## 2019-11-24 NOTE — ED PROVIDER NOTE - OBJECTIVE STATEMENT
35 yo F  hx of "nodule sx from lung" c/o right upper back pains x 4 days. Pain is constant, moderate in severity, worse with breathing, moving, and sneezing. No CP or SOB. No leg pains or leg swelling. No recent travel. No OCP use.

## 2019-11-24 NOTE — ED PROVIDER NOTE - NSFOLLOWUPCLINICS_GEN_ALL_ED_FT
[facility] ([facility])  Rehabilitation  [facility] [place] [address] [place] [address]  Phone: [phone]  Fax:   Follow Up Time: 1-3 Days

## 2019-11-24 NOTE — ED ADULT NURSE NOTE - SUICIDE SCREENING QUESTION 3
Progress Note    Admit Date:  5/14/2019      68-year-old female with nonischemic dilated cardiomyopathy, presented with V. fib arrest.  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 Hep C + . She is mostly homebound. She has been ill with  respiratory symptoms for a couple of months. Mother found her down. CPR initiated,  S/P defibrillation twice en route to hospital.  Admitted  to ICU , on mechanical vent   Echocardiogram with ejection fraction low at 25%   Head CT was negative. status post bronchoscopy 5/16. Patient had significant amount of resp secretions that were aspirated     Good diuresis with lasix, off pressors  Improved EF on echo   Extubated on 5/23      Subjective:    Ms. Lata Coppola seen awake off vent, emotional and crying  Mother at bedside      Objective:   /69   Pulse 102   Temp 99.6 °F (37.6 °C) (Axillary)   Resp 18   Ht 5' (1.524 m)   Wt 180 lb 8 oz (81.9 kg)   SpO2 95%   BMI 35.25 kg/m²       Intake/Output Summary (Last 24 hours) at 5/23/2019 0729  Last data filed at 5/23/2019 0600  Gross per 24 hour   Intake 3824 ml   Output 3950 ml   Net -126 ml       Physical Exam:-  General: young female,  Awake alert off vent. Skin:  Warm and dry  Neck:  JVD absent. Neck supple  Chest:  Diminished breath sounds . No wheezes, rales or rhonchi. Cardiovascular:  RRR ,S1S2 normal  Abdomen:  Soft, non tender, non distended, BS +  Extremities:  Trace edema of upper extremity . No edema of lower extremity. . Left lower extremity atrophy present  Intact peripheral pulses.  Brisk cap refill, < 2 secs  Neuro:  Non focal . Moving all extremities      Medications:   Scheduled Meds:   potassium chloride  40 mEq Oral BID WC    metolazone  5 mg Oral Daily    furosemide  40 mg Intravenous TID    meropenem (MERREM) 1 g IVPB (mini-bag)  1 g Intravenous Q8H    vancomycin  1,000 mg Intravenous Q8H    OLANZapine  15 mg Oral BID    gabapentin  200 mg Oral TID    enoxaparin  40 mg Subcutaneous Daily    aspirin  81 mg Oral Daily    famotidine (PEPCID) injection  20 mg Intravenous BID    albuterol sulfate HFA  4 puff Inhalation Q4H    ipratropium  4 puff Inhalation Q4H    insulin lispro  0-6 Units Subcutaneous Q4H    sodium chloride flush  10 mL Intravenous 2 times per day       Continuous Infusions:   dexmedetomidine (PRECEDEX) IV infusion Stopped (05/22/19 1234)    norepinephrine Stopped (05/21/19 1402)    propofol 50 mcg/kg/min (05/23/19 0336)    dextrose      fentaNYL (SUBLIMAZE) infusion 175 mcg/hr (05/23/19 1587)       Data:  CBC:   Recent Labs     05/21/19 0620 05/22/19  0630 05/23/19  0500   WBC 16.3* 16.1* 15.8*   RBC 4.13 4.27 4.31   HGB 12.4 12.4 12.7   HCT 36.7 37.0 37.4   MCV 88.7 86.6 87.0   RDW 12.8 12.7 12.7    400 421     BMP:   Recent Labs     05/21/19 0620 05/21/19 2000 05/22/19  0553 05/22/19  2030 05/23/19  0500   * 133*  --  132* 134*  --    K 3.1* 3.1*   < > 2.9* 3.0* 2.8*   CL 97* 91*  --  93* 90*  --    CO2 26 30  --  28 30  --    PHOS 4.9  --   --  2.8  --  3.7   BUN 17 15  --  16 22*  --    CREATININE <0.5* <0.5*  --  <0.5* <0.5*  --     < > = values in this interval not displayed. LIVER PROFILE:   Recent Labs     05/21/19 0620 05/22/19  0553   AST 31 37   ALT 25 23   BILIDIR 0.3 <0.2   BILITOT 0.6 0.4   ALKPHOS 118 104          Urine hCG negative     Rapid flu antigen negative       Cultures  Respiratory cultures heavy growth staph - MSSA    Blood - NGTD       Radiology  XR CHEST PORTABLE   Final Result   1. Improving multifocal right lung airspace disease. 2. Increasing left basilar atelectasis or pneumonia. XR CHEST PORTABLE   Final Result   Stable support apparatus. New left perihilar airspace disease and persistent airspace disease   throughout the right lung. Findings may be related to edema and/or pneumonia.          XR CHEST PORTABLE   Final Result   Worsening right lung airspace disease likely represents pneumonia. XR CHEST PORTABLE   Final Result   Stable right basilar and improving left basilar atelectasis and/or pneumonia. XR CHEST PORTABLE   Final Result   Bilateral pulmonary congestion with right basilar consolidation concerning   for pneumonia. Stable support tubes. XR CHEST PORTABLE   Final Result   Grossly stable bilateral airspace disease. XR CHEST PORTABLE   Final Result      XR CHEST PORTABLE   Final Result   Worsening bilateral airspace opacities in a pattern that would favor ARDS. XR CHEST PORTABLE   Final Result   Stable life support device positioning. Persistent perihilar predominant edema and small effusions. XR CHEST PORTABLE   Final Result   Interval improvement in pulmonary edema. XR CHEST PORTABLE   Final Result   Appropriate left IJ central venous catheter positioning. No pneumothorax. Appropriate endotracheal tube positioning. Layering bilateral effusions. Equivocal for a component of superimposed   pulmonary edema. Atelectasis likely present. CT ABDOMEN PELVIS WO CONTRAST Additional Contrast? None   Final Result   1. Bilateral lower lobe atelectasis, right greater than left. 2. Acute right 2nd through 4th and left 3rd through 5th rib fractures         CT Chest Pulmonary Embolism W Contrast   Final Result   1. Bilateral lower lobe atelectasis, right greater than left. 2. Acute right 2nd through 4th and left 3rd through 5th rib fractures         CT Head WO Contrast   Final Result   No acute intracranial abnormality. Left maxillary sinus mucosal thickening. Air-fluid level in the sphenoid   sinus. Correlate with any clinical evidence of sinusitis. XR CHEST PORTABLE   Final Result   Findings as above which may be related to congestive heart failure and edema.              Echo  Summary   Definity contrast administered.   Left ventricular systolic function is reduced with ejection fraction   estimated at 25-30 %.   Elevated left ventricular diastolic filling pressure: Septal E/e'' = 12.6   (for sinus tachycardia) .   Right ventricular systolic function is moderately reduced .   Mild mitral regurgitation.   Unable to obtain SPAP due to lack of tricuspid regurgitation. Repeat echo 5/22       This is a limited study for EF follow up.   Left ventricular systolic function is reduced with ejection fraction   estimated at 40 %.   There is mild to moderate global hypokinesis present.  Wilhelmena Rasher is mild concentric left ventricular hypertrophy. Assessment:  Active Problems:    Acute respiratory failure (HCC)    Aspiration pneumonitis (HCC)    Atrial fibrillation with RVR (HCC)    Electrolyte disturbance    Shock (HCC)    Transaminitis    Hyperglycemia    Pneumonia due to infectious organism    ARDS (adult respiratory distress syndrome) (HCC)    Mucus plugging of bronchi    Cardiac arrest with ventricular fibrillation (HCC)    Multiple tracheobronchial mucus plugs  Resolved Problems:    * No resolved hospital problems. *      Plan:    Cardiac V fib arrest   - status post successful resuscitation   - admitted to ICU, on mech vent . Intensivist and cardiology consulted   - Tele and repeat trops. Initial troponin less than 0.0 1 repeat troponin up to 0.57, and 1.49.   trending down 0.49, 0.26 0.17. - likely related to CPR and demand ischemia  - EP eval once stable and off vent      Acute on chronic systolic CHF  Patient with severe nonischemic dilated cardiomyopathy    - secondary to adriamycin treatment as a child . she had chemotherapy for rhabdomyosarcoma.  Supposedly missed f/w from [facility]     - echo shows ejection fraction of 25%   - treated with  IV Lasix -TID   - consider digoxin or low dose coreg   - improving EF with diuresis and medical mx on repeat echo to 40 %  - need close f/w as outpt    Acute respiratory failure  - sec to cardiac arrest   - continue mechanical ventilation   - seen by intensivist   - Status post bronchoscopy  twice due to increased respiratory secretions  - sputum with MSSA   - off vent today     A. fib   - pt in afib following defib for V fib arrest. Loaded with amiodarone   - continued on  amiodarone drip, and heparin drip   - seen by cardiology  - off amio and hep gtt now . In Normal sinus rhythm    Septic shock   Pneumonia   - ? Aspiration - pt had resp symptoms for 2 months   - Secondary to staph aureus infection.   - Status post bronchoscopy cultures positive for MSSA  - Continue  IV antibiotics , was on vanc and merrem . - she was weaned off of pressors   - can down grade abx      Hypokalemia, Hypomagnesemia  - on replacement protocol. Hyperglycemia  -  on low dose ssi. Elevated LFT's, mild  - possibly related to shock. Repeat. Monitor.   LFTs trending down now     Leukocytosis  - monitor.   - White count improving .      Angiogram and EP studies when hemodynamically stable and off vent     DVT Prophylaxis:  Lovenox  DIET TUBE FEED CONTINUOUS/CYCLIC NPO; Low Calorie High Protein (Vital High-Protein ); Orogastric; Continuous; 20; 40; 20  Dietary Nutrition Supplements: Protein Modular  Code Status: Full Code    Updated mother      Bertha Feliz MD 5/23/2019 7:29 AM No

## 2020-01-20 NOTE — ED ADULT TRIAGE NOTE - PAIN: PRESENCE, MLM
Assessment/Plan:   Diagnoses and all orders for this visit:    Simple chronic bronchitis ([address] )    Ground glass opacity present on imaging of lung  -     CT chest wo contrast; Future    Abnormal chest CT  -     CT chest wo contrast; Future    Tobacco abuse     Patient is here today for follow-up  She remains stable with her breathing   Has a chronic cough at baseline along with postnasal drip and runny nose over the clear mucus   Has chronic mild dyspnea on exertion  PFT done August 2019 shows normal spirometry , lung volumes, shows severe diffusion impairment   She had an echo done in 2018 which did not show any evidence of pulmonary hypertension  She had a repeat CT scan done November 2019 which shows emphysema with scattered ground-glass opacity slightly increased compared to CT scan in August 2019  Findings likely related to chronic infectious or inflammatory process  Will plan to repeat 1 more CT scan in March 2020  If findings persist with need further workup  Findings possibly related to her continued smoking, she continues to try to cut down  She will follow-up with us in March or sooner if necessary  Return in about 2 months (around 3/20/2020)  All questions are answered to the patient's satisfaction and understanding  She verbalizes understanding  She is encouraged to call with any further questions or concerns  Portions of the record may have been created with voice recognition software  Occasional wrong word or "sound a like" substitutions may have occurred due to the inherent limitations of voice recognition software  Read the chart carefully and recognize, using context, where substitutions have occurred  Electronically Signed by Toño Elizabeth PA-C    ______________________________________________________________________    Chief Complaint: No chief complaint on file        Patient ID: Laura Chambers is a 60 y o  y o  female has a past medical history of Anemia, Anemia, Cardiac disorder, CHF (congestive heart failure) ([facility] [address] ), Constipation, COPD (chronic obstructive pulmonary disease) ([facility] [address] ), Depression, Fibromyalgia, Fibromyalgia, primary, Head injury, Heart disorder, Malignant neoplasm ([facility] [address] ), Migraines, Myocardial infarction ([facility] [address] ), Occasional tremors, Osteoarthritis, Osteoporosis, Problems with swallowing, PTSD (post-traumatic stress disorder), Restless leg syndrome, Skin cancer, Stomach problems, and Tobacco abuse     1/20/2020  Patient presents today for follow-up visit  Patient is a 60 yr old female current smoker with past medical history of COPD, asthma, GERD, history of bariatric surgery, anemia, depression, anxiety, thromboangiitis obliterans  She is here today for follow-up  She is overall stable with her breathing  She does have a chronic cough at baseline as well as a runny nose and postnasal drip  Mucus is always clear  She does note some dyspnea on exertion which has been chronic and stable  She is using [employer]  Review of Systems   Constitutional: Negative  HENT: Positive for postnasal drip and rhinorrhea  Respiratory: Positive for cough and shortness of breath  Cardiovascular: Negative  Gastrointestinal: Negative  Genitourinary: Negative  Musculoskeletal: Negative  Skin: Negative  Allergic/Immunologic: Negative  Neurological: Negative  Psychiatric/Behavioral: Negative  Smoking history: She reports that she has been smoking cigarettes  She has a 21 00 pack-year smoking history   She has never used smokeless tobacco     The following portions of the patient's history were reviewed and updated as appropriate: allergies, current medications, past family history, past medical history, past social history, past surgical history and problem list     Immunization History   Administered Date(s) Administered    INFLUENZA 10/03/2014, 09/23/2017    Influenza Quadrivalent, 6-35 Months IM 09/23/2017    Influenza, recombinant, quadrivalent,injectable, preservative free 10/08/2018, 10/24/2019    Pneumococcal Conjugate 13-Valent 10/24/2019     Current Outpatient Medications   Medication Sig Dispense Refill    albuterol (2 5 mg/3 mL) 0 083 % nebulizer solution Take 1 vial (2 5 mg total) by nebulization every 6 (six) hours as needed for wheezing or shortness of breath 1080 mL 3    albuterol (PROVENTIL HFA,VENTOLIN HFA) 90 mcg/act inhaler Inhale 2 puffs every 6 (six) hours as needed for wheezing 1 Inhaler 5    ARIPiprazole (ABILIFY) 2 mg tablet Take 2 mg by mouth daily at bedtime  0    Blood Pressure Monitoring (SPHYGMOMANOMETER) MISC Medically necessary to monitor blood pressure due to orthostatic hypotension 1 each 0    buprenorphine-naloxone (SUBOXONE) 2-0 5 mg per SL tablet Place under the tongue daily      butalbital-aspirin-caffeine (FIORINAL) -40 MG per tablet Take 1 tablet by mouth every 4 (four) hours as needed for headaches      calcium carbonate-vitamin D (OSCAL-D) 500 mg-200 units per tablet Take 2 tablets by mouth daily with breakfast 60 tablet 0    clonazePAM (KlonoPIN) 0 5 mg tablet Take 0 5 mg by mouth 3 (three) times a day   1    diphenhydrAMINE (BENADRYL) 25 mg tablet Take 25 mg by mouth every 6 (six) hours as needed for itching      doxepin (SINEquan) 150 MG capsule 150 mg daily at bedtime  1    famotidine (PEPCID) 20 mg tablet Take 2 tablets (40 mg total) by mouth daily at bedtime 1 PO BID 60 tablet 2    fluticasone (FLONASE) 50 mcg/act nasal spray 1 spray 2 (two) times a day as needed for rhinitis   2    gabapentin (NEURONTIN) 300 mg capsule Take 1 capsule (300 mg total) by mouth 3 (three) times a day 90 capsule 0    gabapentin (NEURONTIN) 800 mg tablet Take 800 mg by mouth 4 (four) times a day  3    glucose blood (ONE TOUCH ULTRA TEST) test strip Patient to test two times daily 200 each 3    Lancets (ONETOUCH ULTRASOFT) lancets Patient to test two times daily 200 each 3    lidocaine (XYLOCAINE) 5 % ointment apply locally FOUR TIMES DAILY  1    midodrine (PROAMATINE) 5 mg tablet 1 5 p  o  T i d , last dose before 6:00 p m  270 tablet 0    pantoprazole (PROTONIX) 40 mg tablet Take 1 tablet (40 mg total) by mouth 2 (two) times a day 60 tablet 3    primidone (MYSOLINE) 50 mg tablet TAKE 2 TABLETS BY MOUTH AT BEDTIME 180 tablet 1    rOPINIRole (REQUIP) 0 25 mg tablet TAKE 1 TABLET (0 25 MG TOTAL) BY MOUTH 3 (THREE) TIMES A  tablet 1    Simethicone (GAS-X PO) Take 1 tablet by mouth as needed      TiZANidine (ZANAFLEX) 2 MG capsule Take 1 capsule (2 mg total) by mouth 3 (three) times a day as needed for muscle spasms 90 capsule 11    venlafaxine 150 MG TB24 Take 150 mg by mouth every morning  0    venlafaxine 225 MG TB24 Take 225 mg by mouth every morning Taken with a 150 mg tablet  1    WIXELA INHUB 250-50 MCG/DOSE inhaler INHALE 1 PUFF TWICE A DAY, RINSE MOUTH AFTER USE 3 Inhaler 4    zafirlukast (ACCOLATE) 20 MG tablet Take 1 tablet (20 mg total) by mouth 2 (two) times a day before meals 180 tablet 3    Blood Glucose Monitoring Suppl (ONE TOUCH ULTRA 2) w/Device KIT Test daily and as instructed (Patient not taking: Reported on 1/15/2020) 1 each 0    Blood Pressure Monitoring (BLOOD PRESSURE CUFF) MISC by Does not apply route 2 (two) times a day (Patient not taking: Reported on 1/15/2020) 1 each 0    clotrimazole (LOTRIMIN) 1 % cream Apply topically 2 (two) times a day for 14 days 30 g 0    Respiratory Therapy Supplies (NEBULIZER/TUBING/MOUTHPIECE) KIT Use up to 6 times daily as needed for lung symptoms 1 each 3     No current facility-administered medications for this visit  Allergies: Morphine and related; Quetiapine; and Sulfa antibiotics    Objective:  Vitals:    01/20/20 1511   BP: 104/70   Pulse: 105   SpO2: 92%   Weight: 72 1 kg (159 lb)   Height: 5' 3" (1 6 m)   Oxygen Therapy  SpO2: 92 %      Wt Readings from Last 3 Encounters:   01/20/20 72 1 kg (159 lb)   01/14/20 77 6 kg (171 lb)   10/24/19 78 2 kg (172 lb 6 4 oz)     Body mass index is 28 17 kg/m²  Physical Exam   Constitutional: She is oriented to person, place, and time  She appears well-developed and well-nourished  No distress  HENT:   Mouth/Throat: Oropharynx is clear and moist    Eyes: Pupils are equal, round, and reactive to light  Cardiovascular: Normal rate, regular rhythm and normal heart sounds  No murmur heard  Pulmonary/Chest: Effort normal and breath sounds normal  No accessory muscle usage  No respiratory distress  She has no decreased breath sounds  She has no wheezes  She has no rhonchi  She has no rales  Abdominal: Soft  There is no tenderness  Musculoskeletal: Normal range of motion  Neurological: She is alert and oriented to person, place, and time  Skin: Skin is warm and dry  No rash noted  Psychiatric: She has a normal mood and affect  Lab Review:   Lab Results   Component Value Date    K 4 9 09/26/2019     09/26/2019    CO2 26 09/26/2019    BUN 16 09/26/2019    CREATININE 0 85 09/26/2019    CALCIUM 8 8 09/26/2019     Lab Results   Component Value Date    WBC 11 29 (H) 05/27/2019    HGB 13 0 05/27/2019    HCT 41 3 05/27/2019    MCV 89 05/27/2019     05/27/2019       Diagnostics:  I have personally reviewed pertinent reports  and I have personally reviewed pertinent films in PACS  Reviewed CT scan  Office Spirometry Results:     ESS:    No results found  complains of pain/discomfort

## 2020-07-30 NOTE — ED ADULT NURSE NOTE - NSHOSCREENINGQ1_ED_ALL_ED
PT DAILY TREATMENT NOTE 10-18    Patient Name: Daniela Gardner  Date:2020  : 1953  [x]  Patient  Verified  [place]or: [place] MED[place] / Plan: [place] MEDICARE PART A & B / Product Type: Medicare /    In time:1100  Out time:1200  Total Treatment Time (min): 60  Visit #: 2 of 8    Medicare/BCBS Only   Total Timed Codes (min):  60 1:1 Treatment Time:  45       Treatment Area: Left ankle pain [M25.572]    SUBJECTIVE  Pain Level (0-10 scale): 3 ankle, 2 hip  Any medication changes, allergies to medications, adverse drug reactions, diagnosis change, or new procedure performed?: [x] No    [] Yes (see summary sheet for update)  Subjective functional status/changes:   [] No changes reported  Reports walked a mile yesterday and forgot to ice and is feeling some pain today. OBJECTIVE    Modality rationale: decrease inflammation and decrease pain to improve the patients ability to perform ADLs. Min Type Additional Details    [x]  Vasopneumatic Device  Cold pack on left hip Pressure:       [x] lo [] med [] hi   Temperature: [x] lo [] med [] hi   [] Skin assessment post-treatment:  []intact []redness- no adverse reaction    []redness  adverse reaction:     27 min Therapeutic Exercise:  [x] See flow sheet : LE strengthening per flowsheet   Rationale: increase ROM and increase strength to improve the patients ability to ambulate with reduced pain. 10 min Neuromuscular Re-education:  [x]  See flow sheet : balance exercises per flowsheet with bosu and airex   Rationale: increase strength, improve balance and increase proprioception  to improve the patients ability to ambulate with improved ankle control and reduced pain. 8 min Manual Therapy:  Long sitting -- talocrural and subtalar GR3 oscillations in all directions to improve ankle mobility   Rationale: decrease pain, increase ROM and increase tissue extensibility to improve ease of ADLs.            With   [] TE   [] TA   [] neuro   [] other: Patient Education: [x] Review HEP    [] Progressed/Changed HEP based on:   [] positioning   [] body mechanics   [] transfers   [] heat/ice application    [] other:      Other Objective/Functional Measures: SLS compliant surface right LE 22 sec     Pain Level (0-10 scale) post treatment: 3 ankle, 1 hip    ASSESSMENT/Changes in Function: Pt participates fully in session and performs new bosu stability exercises with SBA and verbal cueing. Ankle pain is unchanged following today's session and pt instructed to continue icing. She has a podiatry appointment next week to be fitting for custom inserts. Patient will continue to benefit from skilled PT services to modify and progress therapeutic interventions, address functional mobility deficits, address ROM deficits, address strength deficits, analyze and address soft tissue restrictions, analyze and cue movement patterns, analyze and modify body mechanics/ergonomics, assess and modify postural abnormalities, address imbalance/dizziness and instruct in home and community integration to attain remaining goals. [x]  See Plan of Care  []  See progress note/recertification  []  See Discharge Summary         Progress towards goals / Updated goals:  1. Pt will demonstrate 68 FOTO score to improve abilities to household chores.              PN: progressing, 57   2. Pt will report no higher than 2/10 pain to improve patient's ability to complete complete self care independently.               PN: progressing, 5/10  3. Pt will demonstrate at least 4+/5 strength in B LE to improve patient's ability to get out of chair with equal weight distrubution.               PN: progressing, B LE grossly 4/5   4. Pt will demonstrate at least 30 seconds of SLS EO no UE on compliant surface to increase motor and sensory balance strategies to improve strength and decrease risk of falls.               PN: progressing, left SLS 15 sec EO airex               Current: progressing, right SLS airex EO 30 sec, left 18 sec airex EO (7/28/2020)    PLAN  []  Upgrade activities as tolerated     [x]  Continue plan of care  []  Update interventions per flow sheet       []  Discharge due to:_  []  Other:_      Gloria Upton, PT 7/30/2020  11:08 AM    Future Appointments   Date Time Provider Meghna Moreno   8/4/2020  8:30 AM [facility] US RM 2 [facility]   8/4/2020  3:00 PM Sugey Gamble, PTA [facility]   8/6/2020  2:30 PM Sugey Gamble, PTA [facility]   8/11/2020 11:15 AM Sugey Gamble, PTA [facility]   8/13/2020 11:30 AM Prudence Meals, PT [facility]   8/18/2020 11:15 AM Prudence Meals, PT [facility]   8/20/2020 11:30 AM Prudence Meals, PT [facility] No

## 2020-08-23 ENCOUNTER — EMERGENCY (EMERGENCY)
Facility: HOSPITAL | Age: 35
LOS: 0 days | Discharge: HOME | End: 2020-08-23
Attending: EMERGENCY MEDICINE | Admitting: EMERGENCY MEDICINE
Payer: COMMERCIAL

## 2020-08-23 VITALS
SYSTOLIC BLOOD PRESSURE: 109 MMHG | OXYGEN SATURATION: 98 % | RESPIRATION RATE: 17 BRPM | TEMPERATURE: 98 F | DIASTOLIC BLOOD PRESSURE: 60 MMHG | HEART RATE: 88 BPM

## 2020-08-23 VITALS
DIASTOLIC BLOOD PRESSURE: 81 MMHG | SYSTOLIC BLOOD PRESSURE: 138 MMHG | RESPIRATION RATE: 18 BRPM | WEIGHT: 158.73 LBS | OXYGEN SATURATION: 100 % | HEART RATE: 111 BPM | TEMPERATURE: 99 F

## 2020-08-23 DIAGNOSIS — R51 HEADACHE: ICD-10-CM

## 2020-08-23 DIAGNOSIS — Z3A.12 12 WEEKS GESTATION OF PREGNANCY: ICD-10-CM

## 2020-08-23 DIAGNOSIS — O99.89 OTHER SPECIFIED DISEASES AND CONDITIONS COMPLICATING PREGNANCY, CHILDBIRTH AND THE PUERPERIUM: ICD-10-CM

## 2020-08-23 LAB
ALBUMIN SERPL ELPH-MCNC: 3.9 G/DL — SIGNIFICANT CHANGE UP (ref 3.5–5.2)
ALP SERPL-CCNC: 76 U/L — SIGNIFICANT CHANGE UP (ref 30–115)
ALT FLD-CCNC: 11 U/L — SIGNIFICANT CHANGE UP (ref 0–41)
ANION GAP SERPL CALC-SCNC: 12 MMOL/L — SIGNIFICANT CHANGE UP (ref 7–14)
APPEARANCE UR: CLEAR — SIGNIFICANT CHANGE UP
APTT BLD: 28.4 SEC — SIGNIFICANT CHANGE UP (ref 27–39.2)
AST SERPL-CCNC: 19 U/L — SIGNIFICANT CHANGE UP (ref 0–41)
BASOPHILS # BLD AUTO: 0.02 K/UL — SIGNIFICANT CHANGE UP (ref 0–0.2)
BASOPHILS NFR BLD AUTO: 0.2 % — SIGNIFICANT CHANGE UP (ref 0–1)
BILIRUB DIRECT SERPL-MCNC: <0.2 MG/DL — SIGNIFICANT CHANGE UP (ref 0–0.2)
BILIRUB INDIRECT FLD-MCNC: >0.1 MG/DL — LOW (ref 0.2–1.2)
BILIRUB SERPL-MCNC: 0.3 MG/DL — SIGNIFICANT CHANGE UP (ref 0.2–1.2)
BILIRUB UR-MCNC: NEGATIVE — SIGNIFICANT CHANGE UP
BUN SERPL-MCNC: 10 MG/DL — SIGNIFICANT CHANGE UP (ref 10–20)
CALCIUM SERPL-MCNC: 9.4 MG/DL — SIGNIFICANT CHANGE UP (ref 8.5–10.1)
CHLORIDE SERPL-SCNC: 100 MMOL/L — SIGNIFICANT CHANGE UP (ref 98–110)
CO2 SERPL-SCNC: 20 MMOL/L — SIGNIFICANT CHANGE UP (ref 17–32)
COLOR SPEC: COLORLESS — SIGNIFICANT CHANGE UP
CREAT SERPL-MCNC: 0.6 MG/DL — LOW (ref 0.7–1.5)
DIFF PNL FLD: NEGATIVE — SIGNIFICANT CHANGE UP
EOSINOPHIL # BLD AUTO: 0.08 K/UL — SIGNIFICANT CHANGE UP (ref 0–0.7)
EOSINOPHIL NFR BLD AUTO: 1 % — SIGNIFICANT CHANGE UP (ref 0–8)
GLUCOSE SERPL-MCNC: 66 MG/DL — LOW (ref 70–99)
GLUCOSE UR QL: NEGATIVE — SIGNIFICANT CHANGE UP
HCT VFR BLD CALC: 40.2 % — SIGNIFICANT CHANGE UP (ref 37–47)
HGB BLD-MCNC: 13.3 G/DL — SIGNIFICANT CHANGE UP (ref 12–16)
IMM GRANULOCYTES NFR BLD AUTO: 0.4 % — HIGH (ref 0.1–0.3)
INR BLD: 1.07 RATIO — SIGNIFICANT CHANGE UP (ref 0.65–1.3)
KETONES UR-MCNC: NEGATIVE — SIGNIFICANT CHANGE UP
LEUKOCYTE ESTERASE UR-ACNC: NEGATIVE — SIGNIFICANT CHANGE UP
LYMPHOCYTES # BLD AUTO: 2.88 K/UL — SIGNIFICANT CHANGE UP (ref 1.2–3.4)
LYMPHOCYTES # BLD AUTO: 35.8 % — SIGNIFICANT CHANGE UP (ref 20.5–51.1)
MCHC RBC-ENTMCNC: 29.2 PG — SIGNIFICANT CHANGE UP (ref 27–31)
MCHC RBC-ENTMCNC: 33.1 G/DL — SIGNIFICANT CHANGE UP (ref 32–37)
MCV RBC AUTO: 88.2 FL — SIGNIFICANT CHANGE UP (ref 81–99)
MONOCYTES # BLD AUTO: 0.71 K/UL — HIGH (ref 0.1–0.6)
MONOCYTES NFR BLD AUTO: 8.8 % — SIGNIFICANT CHANGE UP (ref 1.7–9.3)
NEUTROPHILS # BLD AUTO: 4.33 K/UL — SIGNIFICANT CHANGE UP (ref 1.4–6.5)
NEUTROPHILS NFR BLD AUTO: 53.8 % — SIGNIFICANT CHANGE UP (ref 42.2–75.2)
NITRITE UR-MCNC: NEGATIVE — SIGNIFICANT CHANGE UP
NRBC # BLD: 0 /100 WBCS — SIGNIFICANT CHANGE UP (ref 0–0)
PH UR: 6.5 — SIGNIFICANT CHANGE UP (ref 5–8)
PLATELET # BLD AUTO: 285 K/UL — SIGNIFICANT CHANGE UP (ref 130–400)
POTASSIUM SERPL-MCNC: 4.4 MMOL/L — SIGNIFICANT CHANGE UP (ref 3.5–5)
POTASSIUM SERPL-SCNC: 4.4 MMOL/L — SIGNIFICANT CHANGE UP (ref 3.5–5)
PROT SERPL-MCNC: 6.9 G/DL — SIGNIFICANT CHANGE UP (ref 6–8)
PROT UR-MCNC: NEGATIVE — SIGNIFICANT CHANGE UP
PROTHROM AB SERPL-ACNC: 12.3 SEC — SIGNIFICANT CHANGE UP (ref 9.95–12.87)
RBC # BLD: 4.56 M/UL — SIGNIFICANT CHANGE UP (ref 4.2–5.4)
RBC # FLD: 13.4 % — SIGNIFICANT CHANGE UP (ref 11.5–14.5)
SODIUM SERPL-SCNC: 132 MMOL/L — LOW (ref 135–146)
SP GR SPEC: 1.01 — LOW (ref 1.01–1.02)
UROBILINOGEN FLD QL: SIGNIFICANT CHANGE UP
WBC # BLD: 8.05 K/UL — SIGNIFICANT CHANGE UP (ref 4.8–10.8)
WBC # FLD AUTO: 8.05 K/UL — SIGNIFICANT CHANGE UP (ref 4.8–10.8)

## 2020-08-23 PROCEDURE — 99285 EMERGENCY DEPT VISIT HI MDM: CPT | Mod: 25

## 2020-08-23 RX ORDER — SODIUM CHLORIDE 9 MG/ML
1000 INJECTION INTRAMUSCULAR; INTRAVENOUS; SUBCUTANEOUS ONCE
Refills: 0 | Status: COMPLETED | OUTPATIENT
Start: 2020-08-23 | End: 2020-08-23

## 2020-08-23 RX ORDER — METOCLOPRAMIDE HCL 10 MG
10 TABLET ORAL ONCE
Refills: 0 | Status: COMPLETED | OUTPATIENT
Start: 2020-08-23 | End: 2020-08-23

## 2020-08-23 RX ORDER — METOCLOPRAMIDE HCL 10 MG
1 TABLET ORAL
Qty: 12 | Refills: 0
Start: 2020-08-23 | End: 2020-08-26

## 2020-08-23 RX ORDER — ACETAMINOPHEN 500 MG
975 TABLET ORAL ONCE
Refills: 0 | Status: COMPLETED | OUTPATIENT
Start: 2020-08-23 | End: 2020-08-23

## 2020-08-23 RX ADMIN — Medication 975 MILLIGRAM(S): at 13:35

## 2020-08-23 RX ADMIN — SODIUM CHLORIDE 1000 MILLILITER(S): 9 INJECTION INTRAMUSCULAR; INTRAVENOUS; SUBCUTANEOUS at 13:34

## 2020-08-23 RX ADMIN — Medication 10 MILLIGRAM(S): at 13:34

## 2020-08-23 NOTE — ED PROVIDER NOTE - OBJECTIVE STATEMENT
34 y/o female with hx Migraines, currently 12 weeks pregnant presents to the ED c/o "I have left sided headache pressure for 3 days. I've been taking Tylenol with no relief." no hx trauma/ n/v/d/fever/ chills/ numbness/ tingling

## 2020-08-23 NOTE — ED PROVIDER NOTE - PROGRESS NOTE DETAILS
Attending Note:   34 yo F 12 months pregnant by LMP PMH migraines here for eval of HA. C/o L temporal HA x2 days. No photophobia, fever, chills, or neck pain. Not improved with NSAIDs at home. On exam: Pt crying, uncomfortable appearing, PERRLA, EOMI, normal finger to nose, normal gait, gravid abd, moving all ext, sensation grossly intact. I&P: HA. Pocus fetal HR, labs, Tylenol, Reglan, and reeval. TERRY: Pt sleeping with no distress Bedside us fhr 164; patient improved; ha resolved, strict return precautions. pt presented for eval of a left sided headache in setting of hx of migraines and 12 week pregnant. neuro exam wnl, pt felt better after meds. pt ambulated in ed, eager for dc

## 2020-08-23 NOTE — ED ADULT NURSE NOTE - OBJECTIVE STATEMENT
Patient c/o headache x 3 days, without relief from Aleve. Patient last took Aleve 5 hours ago. Patient 12 weeks pregnant. Denies nausea/vomiting/fever/chills/SOB/visual changes/CP. On assessment no s.s of distress noted.

## 2020-09-01 ENCOUNTER — APPOINTMENT (OUTPATIENT)
Dept: MATERNAL FETAL MEDICINE | Facility: CLINIC | Age: 35
End: 2020-09-01

## 2020-09-01 ENCOUNTER — APPOINTMENT (OUTPATIENT)
Dept: ANTEPARTUM | Facility: CLINIC | Age: 35
End: 2020-09-01

## 2020-10-22 ENCOUNTER — APPOINTMENT (OUTPATIENT)
Dept: MATERNAL FETAL MEDICINE | Facility: CLINIC | Age: 35
End: 2020-10-22
Payer: COMMERCIAL

## 2020-10-22 PROCEDURE — 76811 OB US DETAILED SNGL FETUS: CPT

## 2020-10-22 PROCEDURE — 99242 OFF/OP CONSLTJ NEW/EST SF 20: CPT

## 2020-10-22 PROCEDURE — 76817 TRANSVAGINAL US OBSTETRIC: CPT

## 2020-10-22 PROCEDURE — 99072 ADDL SUPL MATRL&STAF TM PHE: CPT

## 2021-01-11 ENCOUNTER — APPOINTMENT (OUTPATIENT)
Dept: MATERNAL FETAL MEDICINE | Facility: CLINIC | Age: 36
End: 2021-01-11
Payer: MEDICAID

## 2021-01-11 PROCEDURE — 76819 FETAL BIOPHYS PROFIL W/O NST: CPT

## 2021-01-11 PROCEDURE — 76816 OB US FOLLOW-UP PER FETUS: CPT

## 2021-01-11 PROCEDURE — 76817 TRANSVAGINAL US OBSTETRIC: CPT

## 2021-01-11 PROCEDURE — 99072 ADDL SUPL MATRL&STAF TM PHE: CPT

## 2021-01-20 ENCOUNTER — APPOINTMENT (OUTPATIENT)
Dept: MATERNAL FETAL MEDICINE | Facility: CLINIC | Age: 36
End: 2021-01-20
Payer: MEDICAID

## 2021-01-20 PROCEDURE — 99215 OFFICE O/P EST HI 40 MIN: CPT

## 2021-01-20 PROCEDURE — 76816 OB US FOLLOW-UP PER FETUS: CPT

## 2021-01-20 PROCEDURE — 76819 FETAL BIOPHYS PROFIL W/O NST: CPT

## 2021-01-20 PROCEDURE — 99072 ADDL SUPL MATRL&STAF TM PHE: CPT

## 2021-01-26 ENCOUNTER — APPOINTMENT (OUTPATIENT)
Dept: MATERNAL FETAL MEDICINE | Facility: CLINIC | Age: 36
End: 2021-01-26
Payer: MEDICAID

## 2021-01-26 PROCEDURE — 99213 OFFICE O/P EST LOW 20 MIN: CPT

## 2021-01-26 PROCEDURE — 76819 FETAL BIOPHYS PROFIL W/O NST: CPT

## 2021-01-26 PROCEDURE — 99072 ADDL SUPL MATRL&STAF TM PHE: CPT

## 2021-01-26 PROCEDURE — 76815 OB US LIMITED FETUS(S): CPT

## 2021-02-02 ENCOUNTER — APPOINTMENT (OUTPATIENT)
Dept: MATERNAL FETAL MEDICINE | Facility: CLINIC | Age: 36
End: 2021-02-02

## 2021-02-09 ENCOUNTER — APPOINTMENT (OUTPATIENT)
Dept: MATERNAL FETAL MEDICINE | Facility: CLINIC | Age: 36
End: 2021-02-09
Payer: MEDICAID

## 2021-02-09 PROCEDURE — 76819 FETAL BIOPHYS PROFIL W/O NST: CPT

## 2021-02-09 PROCEDURE — 76817 TRANSVAGINAL US OBSTETRIC: CPT

## 2021-02-09 PROCEDURE — 76816 OB US FOLLOW-UP PER FETUS: CPT

## 2021-02-09 PROCEDURE — 99213 OFFICE O/P EST LOW 20 MIN: CPT

## 2021-02-09 PROCEDURE — 99072 ADDL SUPL MATRL&STAF TM PHE: CPT

## 2021-02-17 ENCOUNTER — APPOINTMENT (OUTPATIENT)
Dept: MATERNAL FETAL MEDICINE | Facility: CLINIC | Age: 36
End: 2021-02-17

## 2021-02-24 ENCOUNTER — APPOINTMENT (OUTPATIENT)
Dept: MATERNAL FETAL MEDICINE | Facility: CLINIC | Age: 36
End: 2021-02-24
Payer: MEDICAID

## 2021-02-24 ENCOUNTER — OUTPATIENT (OUTPATIENT)
Dept: OUTPATIENT SERVICES | Facility: HOSPITAL | Age: 36
LOS: 1 days | Discharge: HOME | End: 2021-02-24

## 2021-02-24 DIAGNOSIS — Z11.59 ENCOUNTER FOR SCREENING FOR OTHER VIRAL DISEASES: ICD-10-CM

## 2021-02-24 PROCEDURE — 99072 ADDL SUPL MATRL&STAF TM PHE: CPT

## 2021-02-24 PROCEDURE — 76819 FETAL BIOPHYS PROFIL W/O NST: CPT

## 2021-02-24 PROCEDURE — 76816 OB US FOLLOW-UP PER FETUS: CPT

## 2021-02-25 ENCOUNTER — TRANSCRIPTION ENCOUNTER (OUTPATIENT)
Age: 36
End: 2021-02-25

## 2021-02-25 ENCOUNTER — INPATIENT (INPATIENT)
Facility: HOSPITAL | Age: 36
LOS: 1 days | Discharge: HOME | End: 2021-02-27
Attending: OBSTETRICS & GYNECOLOGY | Admitting: OBSTETRICS & GYNECOLOGY

## 2021-02-25 VITALS — SYSTOLIC BLOOD PRESSURE: 124 MMHG | DIASTOLIC BLOOD PRESSURE: 80 MMHG | HEART RATE: 71 BPM

## 2021-02-25 DIAGNOSIS — Z98.890 OTHER SPECIFIED POSTPROCEDURAL STATES: Chronic | ICD-10-CM

## 2021-02-25 DIAGNOSIS — Z98.891 HISTORY OF UTERINE SCAR FROM PREVIOUS SURGERY: Chronic | ICD-10-CM

## 2021-02-25 LAB
AMPHET UR-MCNC: NEGATIVE — SIGNIFICANT CHANGE UP
APPEARANCE UR: CLEAR — SIGNIFICANT CHANGE UP
BARBITURATES UR SCN-MCNC: NEGATIVE — SIGNIFICANT CHANGE UP
BASOPHILS # BLD AUTO: 0.01 K/UL — SIGNIFICANT CHANGE UP (ref 0–0.2)
BASOPHILS NFR BLD AUTO: 0.2 % — SIGNIFICANT CHANGE UP (ref 0–1)
BENZODIAZ UR-MCNC: NEGATIVE — SIGNIFICANT CHANGE UP
BILIRUB UR-MCNC: NEGATIVE — SIGNIFICANT CHANGE UP
BLD GP AB SCN SERPL QL: SIGNIFICANT CHANGE UP
BUPRENORPHINE SCREEN, URINE RESULT: NEGATIVE — SIGNIFICANT CHANGE UP
COCAINE METAB.OTHER UR-MCNC: NEGATIVE — SIGNIFICANT CHANGE UP
COLOR SPEC: SIGNIFICANT CHANGE UP
DIFF PNL FLD: NEGATIVE — SIGNIFICANT CHANGE UP
EOSINOPHIL # BLD AUTO: 0.06 K/UL — SIGNIFICANT CHANGE UP (ref 0–0.7)
EOSINOPHIL NFR BLD AUTO: 1 % — SIGNIFICANT CHANGE UP (ref 0–8)
FENTANYL UR QL: NEGATIVE — SIGNIFICANT CHANGE UP
GLUCOSE BLDC GLUCOMTR-MCNC: 82 MG/DL — SIGNIFICANT CHANGE UP (ref 70–99)
GLUCOSE UR QL: NEGATIVE — SIGNIFICANT CHANGE UP
HCT VFR BLD CALC: 38.2 % — SIGNIFICANT CHANGE UP (ref 37–47)
HGB BLD-MCNC: 13.1 G/DL — SIGNIFICANT CHANGE UP (ref 12–16)
IMM GRANULOCYTES NFR BLD AUTO: 0.5 % — HIGH (ref 0.1–0.3)
KETONES UR-MCNC: NEGATIVE — SIGNIFICANT CHANGE UP
L&D DRUG SCREEN, URINE: SIGNIFICANT CHANGE UP
LEUKOCYTE ESTERASE UR-ACNC: NEGATIVE — SIGNIFICANT CHANGE UP
LYMPHOCYTES # BLD AUTO: 1.81 K/UL — SIGNIFICANT CHANGE UP (ref 1.2–3.4)
LYMPHOCYTES # BLD AUTO: 29.9 % — SIGNIFICANT CHANGE UP (ref 20.5–51.1)
MCHC RBC-ENTMCNC: 30 PG — SIGNIFICANT CHANGE UP (ref 27–31)
MCHC RBC-ENTMCNC: 34.3 G/DL — SIGNIFICANT CHANGE UP (ref 32–37)
MCV RBC AUTO: 87.4 FL — SIGNIFICANT CHANGE UP (ref 81–99)
METHADONE UR-MCNC: NEGATIVE — SIGNIFICANT CHANGE UP
MONOCYTES # BLD AUTO: 0.59 K/UL — SIGNIFICANT CHANGE UP (ref 0.1–0.6)
MONOCYTES NFR BLD AUTO: 9.8 % — HIGH (ref 1.7–9.3)
NEUTROPHILS # BLD AUTO: 3.55 K/UL — SIGNIFICANT CHANGE UP (ref 1.4–6.5)
NEUTROPHILS NFR BLD AUTO: 58.6 % — SIGNIFICANT CHANGE UP (ref 42.2–75.2)
NITRITE UR-MCNC: NEGATIVE — SIGNIFICANT CHANGE UP
NRBC # BLD: 0 /100 WBCS — SIGNIFICANT CHANGE UP (ref 0–0)
OPIATES UR-MCNC: NEGATIVE — SIGNIFICANT CHANGE UP
OXYCODONE UR-MCNC: NEGATIVE — SIGNIFICANT CHANGE UP
PCP UR-MCNC: NEGATIVE — SIGNIFICANT CHANGE UP
PH UR: 6 — SIGNIFICANT CHANGE UP (ref 5–8)
PLATELET # BLD AUTO: 209 K/UL — SIGNIFICANT CHANGE UP (ref 130–400)
PRENATAL SYPHILIS TEST: SIGNIFICANT CHANGE UP
PROPOXYPHENE QUALITATIVE URINE RESULT: NEGATIVE — SIGNIFICANT CHANGE UP
PROT UR-MCNC: SIGNIFICANT CHANGE UP
RBC # BLD: 4.37 M/UL — SIGNIFICANT CHANGE UP (ref 4.2–5.4)
RBC # FLD: 14.3 % — SIGNIFICANT CHANGE UP (ref 11.5–14.5)
SP GR SPEC: 1.02 — SIGNIFICANT CHANGE UP (ref 1.01–1.03)
UROBILINOGEN FLD QL: SIGNIFICANT CHANGE UP
WBC # BLD: 6.05 K/UL — SIGNIFICANT CHANGE UP (ref 4.8–10.8)
WBC # FLD AUTO: 6.05 K/UL — SIGNIFICANT CHANGE UP (ref 4.8–10.8)

## 2021-02-25 RX ORDER — OXYTOCIN 10 UNIT/ML
333.33 VIAL (ML) INJECTION
Qty: 20 | Refills: 0 | Status: DISCONTINUED | OUTPATIENT
Start: 2021-02-25 | End: 2021-02-27

## 2021-02-25 RX ORDER — MORPHINE SULFATE 50 MG/1
0.2 CAPSULE, EXTENDED RELEASE ORAL ONCE
Refills: 0 | Status: DISCONTINUED | OUTPATIENT
Start: 2021-02-25 | End: 2021-02-27

## 2021-02-25 RX ORDER — FAMOTIDINE 10 MG/ML
20 INJECTION INTRAVENOUS ONCE
Refills: 0 | Status: COMPLETED | OUTPATIENT
Start: 2021-02-25 | End: 2021-02-25

## 2021-02-25 RX ORDER — CEFAZOLIN SODIUM 1 G
2000 VIAL (EA) INJECTION ONCE
Refills: 0 | Status: COMPLETED | OUTPATIENT
Start: 2021-02-25 | End: 2021-02-25

## 2021-02-25 RX ORDER — SODIUM CHLORIDE 9 MG/ML
1000 INJECTION, SOLUTION INTRAVENOUS ONCE
Refills: 0 | Status: DISCONTINUED | OUTPATIENT
Start: 2021-02-25 | End: 2021-02-25

## 2021-02-25 RX ORDER — KETOROLAC TROMETHAMINE 30 MG/ML
30 SYRINGE (ML) INJECTION EVERY 6 HOURS
Refills: 0 | Status: DISCONTINUED | OUTPATIENT
Start: 2021-02-25 | End: 2021-02-26

## 2021-02-25 RX ORDER — LANOLIN
1 OINTMENT (GRAM) TOPICAL EVERY 6 HOURS
Refills: 0 | Status: DISCONTINUED | OUTPATIENT
Start: 2021-02-25 | End: 2021-02-27

## 2021-02-25 RX ORDER — CITRIC ACID/SODIUM CITRATE 300-500 MG
30 SOLUTION, ORAL ORAL ONCE
Refills: 0 | Status: COMPLETED | OUTPATIENT
Start: 2021-02-25 | End: 2021-02-25

## 2021-02-25 RX ORDER — DEXAMETHASONE 0.5 MG/5ML
4 ELIXIR ORAL EVERY 6 HOURS
Refills: 0 | Status: DISCONTINUED | OUTPATIENT
Start: 2021-02-25 | End: 2021-02-27

## 2021-02-25 RX ORDER — NALOXONE HYDROCHLORIDE 4 MG/.1ML
0.1 SPRAY NASAL
Refills: 0 | Status: DISCONTINUED | OUTPATIENT
Start: 2021-02-25 | End: 2021-02-27

## 2021-02-25 RX ORDER — HYDROMORPHONE HYDROCHLORIDE 2 MG/ML
0.5 INJECTION INTRAMUSCULAR; INTRAVENOUS; SUBCUTANEOUS
Refills: 0 | Status: DISCONTINUED | OUTPATIENT
Start: 2021-02-25 | End: 2021-02-27

## 2021-02-25 RX ORDER — MAGNESIUM HYDROXIDE 400 MG/1
30 TABLET, CHEWABLE ORAL
Refills: 0 | Status: DISCONTINUED | OUTPATIENT
Start: 2021-02-25 | End: 2021-02-27

## 2021-02-25 RX ORDER — SODIUM CHLORIDE 9 MG/ML
1000 INJECTION, SOLUTION INTRAVENOUS
Refills: 0 | Status: DISCONTINUED | OUTPATIENT
Start: 2021-02-25 | End: 2021-02-27

## 2021-02-25 RX ORDER — METOCLOPRAMIDE HCL 10 MG
10 TABLET ORAL ONCE
Refills: 0 | Status: COMPLETED | OUTPATIENT
Start: 2021-02-25 | End: 2021-02-25

## 2021-02-25 RX ORDER — CEFAZOLIN SODIUM 1 G
2000 VIAL (EA) INJECTION EVERY 8 HOURS
Refills: 0 | Status: COMPLETED | OUTPATIENT
Start: 2021-02-25 | End: 2021-02-26

## 2021-02-25 RX ORDER — OXYCODONE HYDROCHLORIDE 5 MG/1
5 TABLET ORAL
Refills: 0 | Status: DISCONTINUED | OUTPATIENT
Start: 2021-02-25 | End: 2021-02-27

## 2021-02-25 RX ORDER — OXYCODONE HYDROCHLORIDE 5 MG/1
5 TABLET ORAL ONCE
Refills: 0 | Status: DISCONTINUED | OUTPATIENT
Start: 2021-02-25 | End: 2021-02-27

## 2021-02-25 RX ORDER — ENOXAPARIN SODIUM 100 MG/ML
40 INJECTION SUBCUTANEOUS DAILY
Refills: 0 | Status: DISCONTINUED | OUTPATIENT
Start: 2021-02-26 | End: 2021-02-27

## 2021-02-25 RX ORDER — SODIUM CHLORIDE 9 MG/ML
1000 INJECTION, SOLUTION INTRAVENOUS
Refills: 0 | Status: DISCONTINUED | OUTPATIENT
Start: 2021-02-25 | End: 2021-02-25

## 2021-02-25 RX ORDER — ACETAMINOPHEN 500 MG
975 TABLET ORAL
Refills: 0 | Status: DISCONTINUED | OUTPATIENT
Start: 2021-02-25 | End: 2021-02-27

## 2021-02-25 RX ORDER — DIPHENHYDRAMINE HCL 50 MG
25 CAPSULE ORAL EVERY 6 HOURS
Refills: 0 | Status: DISCONTINUED | OUTPATIENT
Start: 2021-02-25 | End: 2021-02-27

## 2021-02-25 RX ORDER — ACETAMINOPHEN 500 MG
1000 TABLET ORAL ONCE
Refills: 0 | Status: COMPLETED | OUTPATIENT
Start: 2021-02-25 | End: 2021-02-25

## 2021-02-25 RX ORDER — SIMETHICONE 80 MG/1
80 TABLET, CHEWABLE ORAL EVERY 4 HOURS
Refills: 0 | Status: DISCONTINUED | OUTPATIENT
Start: 2021-02-25 | End: 2021-02-27

## 2021-02-25 RX ORDER — IBUPROFEN 200 MG
600 TABLET ORAL EVERY 6 HOURS
Refills: 0 | Status: COMPLETED | OUTPATIENT
Start: 2021-02-25 | End: 2022-01-24

## 2021-02-25 RX ORDER — ONDANSETRON 8 MG/1
4 TABLET, FILM COATED ORAL EVERY 6 HOURS
Refills: 0 | Status: DISCONTINUED | OUTPATIENT
Start: 2021-02-25 | End: 2021-02-27

## 2021-02-25 RX ADMIN — Medication 30 MILLILITER(S): at 09:12

## 2021-02-25 RX ADMIN — SIMETHICONE 80 MILLIGRAM(S): 80 TABLET, CHEWABLE ORAL at 21:14

## 2021-02-25 RX ADMIN — Medication 30 MILLIGRAM(S): at 17:59

## 2021-02-25 RX ADMIN — FAMOTIDINE 20 MILLIGRAM(S): 10 INJECTION INTRAVENOUS at 09:09

## 2021-02-25 RX ADMIN — Medication 975 MILLIGRAM(S): at 21:14

## 2021-02-25 RX ADMIN — Medication 10 MILLIGRAM(S): at 09:09

## 2021-02-25 RX ADMIN — Medication 400 MILLIGRAM(S): at 13:20

## 2021-02-25 RX ADMIN — Medication 100 MILLIGRAM(S): at 17:26

## 2021-02-25 RX ADMIN — SIMETHICONE 80 MILLIGRAM(S): 80 TABLET, CHEWABLE ORAL at 17:59

## 2021-02-25 RX ADMIN — Medication 100 MILLIGRAM(S): at 09:11

## 2021-02-25 NOTE — OB PROVIDER H&P - HISTORY OF PRESENT ILLNESS
Pt is a 35y.o.  @ 38.3wks by [facility] c/o ctx since 0200am. Pt denies LOF, VB and states good FM. Pt scheduled for repeat  tomorrow. Pt is GDM A1 Diet control.     Pt was tested for Covid-19 on  and was negative

## 2021-02-25 NOTE — BRIEF OPERATIVE NOTE - NSICDXBRIEFPREOP_GEN_ALL_CORE_FT
PRE-OP DIAGNOSIS:  Normal labor 2021 11:08:55  Pushpa Vizcarra  Gestational diabetes 2021 11:08:42  Pushpa Vizcarra  Term pregnancy 2021 11:08:20  Pushpa Vizcarra  Previous  section 2021 11:07:36  Pushpa Vizcarra

## 2021-02-25 NOTE — PROGRESS NOTE ADULT - SUBJECTIVE AND OBJECTIVE BOX
TYE WIN  35y  Female    PGY2 Note:  Patient seen and examined bedside. Denies heavy vaginal bleeding. Pain well controlled. Not yet ambulating. Tolerating clear liquid diet, simmons in place, not yet passing flatus, no BM. Bottle-feeding.    Physical Exam  Vital Signs Last 24 Hrs  T(C): 35.7 (2021 15:45), Max: 36.9 (2021 06:57)  T(F): 96.2 (2021 15:45), Max: 98.4 (2021 06:57)  HR: 104 (2021 15:45) (58 - 104)  BP: 117/66 (2021 15:45) (117/66 - 134/76)  RR: 20 (2021 15:45) (18 - 20)  SpO2: 100% (2021 14:00) (77% - 100%)    UO: 275cc from 2996-2858, 91cc/hr, adequate 43cc/hr    Gen: NAD, sitting comfortably  CV: RRR. No murmurs gallops or rubs.  Pulm: CTAB. No wheezes or rales.  Ext: No calf tenderness, no swelling.   Abd: moderately distended, soft, nontender, BS+, fundus firm, and below umbilicus.   Lochia: Minimal rubra  Wound: Dressing over pfannenstiel skin incision clean, dry intact. Steris in place. No surrounding edema or erythema      PAST MEDICAL & SURGICAL HISTORY:  Intractable migraine with status migrainosus, unspecified migraine type  Migraine without status migrainosus, not intractable, unspecified migraine type  History of dilatation and curettage  H/O ovarian cystectomy  History of  section      Diet: clear liquid    Labs:                        13.1   6.05  )-----------( 209      ( 2021 07:46 )             38.2      UDS neg  RPR NR  O pos  COVID-19 neg    MEDICATIONS  (STANDING):  acetaminophen   Tablet .. 975 milliGRAM(s) Oral <User Schedule>  ceFAZolin   IVPB 2000 milliGRAM(s) IV Intermittent every 8 hours  ibuprofen  Tablet. 600 milliGRAM(s) Oral every 6 hours  ketorolac   Injectable 30 milliGRAM(s) IV Push every 6 hours  lactated ringers. 1000 milliLiter(s) (125 mL/Hr) IV Continuous <Continuous>  measles/mumps/rubella Vaccine 0.5 milliLiter(s) SubCutaneous once  morphine PF Spinal 0.2 milliGRAM(s) IntraThecal. once  oxytocin Infusion 333.333 milliUNIT(s)/Min (1000 mL/Hr) IV Continuous <Continuous>  oxytocin Infusion 333.333 milliUNIT(s)/Min (1000 mL/Hr) IV Continuous <Continuous>  simethicone 80 milliGRAM(s) Chew every 4 hours    MEDICATIONS  (PRN):  dexAMETHasone  Injectable 4 milliGRAM(s) IV Push every 6 hours PRN Nausea  diphenhydrAMINE 25 milliGRAM(s) Oral every 6 hours PRN Pruritus  HYDROmorphone  Injectable 0.5 milliGRAM(s) IV Push every 10 minutes PRN Moderate Pain (4 - 6)  lanolin Ointment 1 Application(s) Topical every 6 hours PRN Sore Nipples  magnesium hydroxide Suspension 30 milliLiter(s) Oral two times a day PRN Constipation  naloxone Injectable 0.1 milliGRAM(s) IV Push every 3 minutes PRN For ANY of the following changes in patient status:  A. Breaths Per Minute LESS THAN 10, B. Oxygen saturation LESS THAN 90%, C. Sedation score of 6 for Stop After: 4 Times  ondansetron Injectable 4 milliGRAM(s) IV Push every 6 hours PRN Nausea  oxyCODONE    IR 5 milliGRAM(s) Oral every 3 hours PRN Moderate to Severe Pain (4-10)  oxyCODONE    IR 5 milliGRAM(s) Oral once PRN Moderate to Severe Pain (4-10)

## 2021-02-25 NOTE — OB PROVIDER H&P - ASSESSMENT
35y.o.  @ 38.3wks for repeat  in labor, AMA  Admit to L&D  IVF, labs  Continuous efm and toco  SCD's  Rubio catheter  Abdominal prep  On call to OR  Dr. Jose aware

## 2021-02-25 NOTE — OB PROVIDER H&P - NSHPPHYSICALEXAM_GEN_ALL_CORE
T(C): 36.9 (02-25-21 @ 06:57), Max: 36.9 (02-25-21 @ 06:57)  HR: 81 (02-25-21 @ 07:34) (71 - 81)  BP: 127/82 (02-25-21 @ 07:34) (124/80 - 127/82)  RR: 20 (02-25-21 @ 07:34) (18 - 20)    Abd: gravid, soft, no incisional tenderness  VE: FT/Long/-3  Ctx: q 4-6 min  FHR: 135 moderate variablity, Cat I T(C): 36.9 (02-25-21 @ 06:57), Max: 36.9 (02-25-21 @ 06:57)  HR: 81 (02-25-21 @ 07:34) (71 - 81)  BP: 127/82 (02-25-21 @ 07:34) (124/80 - 127/82)  RR: 20 (02-25-21 @ 07:34) (18 - 20)    Glucose 82    Abd: gravid, soft, no incisional tenderness  VE: FT/Long/-3  Ctx: q 4-6 min  FHR: 135 moderate variablity, Cat I

## 2021-02-25 NOTE — OB PROVIDER H&P - NSHPLABSRESULTS_GEN_ALL_CORE
Sono @ 33.6wks cephalic, post placenta, BPP 8/8  Sono @ 33wks S=D, cephalic, EFW: 2050g, 4lb8oz (44%), BPP 8/8, nl dopplers   Sono @ 31.5wks Vtx, post placenta, EFW: 1724g, 2nr36mo (42%)BPP 8/8, nl dopplers  Sono @     NIPT 46XX WNL    , GTT Sono @ 33.6wks cephalic, post placenta, BPP 8/8  Sono @ 33wks S=D, cephalic, EFW: 2050g, 4lb8oz (44%), BPP 8/8, nl dopplers   Sono @ 31.5wks Vtx, post placenta, EFW: 1724g, 2ma32bb (42%)BPP 8/8, nl dopplers  Sono @ 20.1wks S=D, EFW: 321g, post placenta, 3 vessel cord, nl anatomy  Sono @ 12.6wks S=D, NT WNL    Seq screen negative  NIPT 46XX WNL    , GTT 97/ 192/ 171/ 139, Hgb A1c 6.0 1/5/2020

## 2021-02-25 NOTE — OB PROVIDER H&P - ATTENDING COMMENTS
as above, pt seen at bedside, agree with findings, impression and plan    IMP: IUP @ 38.3 wks, GDMA1, ho previous C/S in labor    Plan: Admit for RPT C/S - Consent Obtained - R/B/A/P dw pt, On Call to OR, peds and anesthesia aware

## 2021-02-25 NOTE — CHART NOTE - NSCHARTNOTEFT_GEN_A_CORE
PACU ANESTHESIA ADMISSION NOTE      Procedure: Repeat  section      Post op diagnosis:  Normal labor    Gestational diabetes    Term pregnancy    Previous  section        ____  Intubated  TV:______       Rate: ______      FiO2: ______    _x___  Patent Airway    _x___  Full return of protective reflexes    _x___  Full recovery from anesthesia / back to baseline status    Vitals:  T 97.3 f  HR: 81  BP: 118/68  RR: 17  SpO2: 98% on RA    Mental Status:  _x___ Awake   _____ Alert   __x___ Drowsy   _____ Sedated    Nausea/Vomiting:  _x___  NO       ______Yes,   See Post - Op Orders         Pain Scale (0-10):  __0___    Treatment: _x___ None    ____ See Post - Op/PCA Orders    Post - Operative Fluids:   ____ Oral   __x__ See Post - Op Orders    Plan: Discharge:   ____Home       _x____Floor     _____Critical Care    _____  Other:_________________    Comments: uneventful spinal anesthetic, VSS, full report to ob rn  No anesthesia issues or complications noted.  Discharge when criteria met.

## 2021-02-25 NOTE — PRE-ANESTHESIA EVALUATION ADULT - NSANTHOSAYNRD_GEN_A_CORE
No. JOVANI screening performed.  STOP BANG Legend: 0-2 = LOW Risk; 3-4 = INTERMEDIATE Risk; 5-8 = HIGH Risk

## 2021-02-25 NOTE — OB PROVIDER H&P - NS_OBGYNHISTORY_OBGYN_ALL_OB_FT
x 1, FT 6lb1oz NRFHR   Ectpic tx w/ MTX--> IUP w/ D&C for missed     GYNHx: h/o HPV all PAP's WNl, h/o Ovarian cysts s/p Lap cystectomy

## 2021-02-25 NOTE — BRIEF OPERATIVE NOTE - NSICDXBRIEFPOSTOP_GEN_ALL_CORE_FT
POST-OP DIAGNOSIS:  Normal labor 2021 11:09:46  Pushpa Vizcarra  Gestational diabetes 2021 11:09:38  Pushpa Vizcarra  Term pregnancy 2021 11:09:30  Pushpa Vizcarra  Previous  section 2021 11:09:05  Pushpa Vizcarra

## 2021-02-25 NOTE — OB RN DELIVERY SUMMARY - NS_ROMTYPE_OBGYN_ALL_OB
MEDICARE WELLNESS VISIT NOTE      History of Present Illness:   Malcom Fernandez presents for his Subsequent Annual Medicare Wellness Visit.   He has no current complaints or concerns.      Patient Care Team:  Veronika Hassan NP as PCP - General (Nurse Practitioner)  Ramiro Dupree MD as Cardiologist (Peripheral Vascular Medicine)  Vignesh Olivas MD as Urologist (Urology)  Rodolfo Simpson, OD as Ophthalmology (Optometrist - Corneal and Contact Management)  Other Outpatient Services as Dentist- General Practice (Dentist - General Practice)  Luis Miguel Adams MD as Hematology (Hematology & Oncology)  Landon Adams MD as Gastroenterologist (Gastroenterology)        Patient Active Problem List    Diagnosis Date Noted   • Chronic deep vein thrombosis (DVT) of both lower extremities (CMS/[facility]) 09/19/2017     Priority: Low   • Chronic anticoagulation 07/23/2016     Priority: Low   • Benign prostatic hypertrophy with incomplete bladder emptying 01/08/2016     Priority: Low   • MCI (mild cognitive impairment) 12/10/2015     Priority: Low   • Colon polyp 10/21/2015     Priority: Low   • Diverticulosis 10/21/2015     Priority: Low   • Vitamin D deficiency 09/14/2015     Priority: Low   • Hearing loss in left ear 09/14/2015     Priority: Low   • Arthritis 09/10/2013     Priority: Low   • GERD (gastroesophageal reflux disease) 04/24/2013     Priority: Low   • Essential hypertension, benign 03/14/2013     Priority: Low   • Hyperlipidemia 03/14/2013     Priority: Low   • Generalized anxiety disorder 03/14/2013     Priority: Low   • Coronary atherosclerosis of unspecified type of vessel, native or graft 01/14/2013     Priority: Low         Past Medical History:   Diagnosis Date   • Acute blood loss anemia 8/16   • Anemia     1 blood transfusion in Aug 2016   • Arthritis     Elbows and knees   • BPH (benign prostatic hyperplasia)     followed up with Urology - Dr. Anaya   • CAD (coronary artery disease)     See Dr Dupree  cardiology   • Cataract    • Cholelithiasis     CT 2010 at [facility]   • Cognitive changes 11/16/15    frontal subcortical dysfunction    • Colon ulcer 8/2/16    no active bleeding    • Conjunctivitis unspecified     Follows Dr. Arteaga at [facility]   • Depression     Improving   • DVT (deep venous thrombosis) (CMS/HCC) 6/12/15    fell off step, skinned leg   • DVT (deep venous thrombosis) (CMS/HCC) 1/7/16    definitive treatment per hematology   • Elevated PSA 9/15/15    referred to urology   • Fracture     left great toe, hx of fractured ribs, shoulder   • GERD (gastroesophageal reflux disease)    • Hearing decreased 9/11/14    Left ear   • Hematuria     followed up with Urology - Dr. Anaya   • HTN (hypertension)    • Hx of esophagogastroduodenoscopy with dil 09/23/2014    dysphagia and heartburn   • Hyperlipidemia     Does not treat with statins- refuses   • Neck injury     Fell at work, no surgery but reports fusion   • PE (pulmonary thromboembolism) (CMS/HCC) 12/16/13    CTA.  INR goal 2-3, for six months, Stopped treatment 9/14   • Pneumonia    • PONV (postoperative nausea and vomiting)    • Pulmonary HTN (CMS/HCC) 12/17/13    Echo, 2nd to bilateral PE   • Pulmonary nodules     CT [facility] 2010, CT 12/13 7 mm (no change)   • Renal stone    • Rhinitis     at night    • Rotator cuff tear     No surgery- right   • Seizures (CMS/HCC)     photosensitive seizures ?   • Squamous cell cancer of skin of earlobe     LEFT   • Urinary retention 4/22/13    -600   • Urinary tract infection    • Urinary urgency    • Varicose vein of leg     associated pain   • Vitamin D deficiency 9/15/15         Past Surgical History:   Procedure Laterality Date   • CARPAL TUNNEL RELEASE  2/18/13    Right    • CARPAL TUNNEL RELEASE  6/4/13    left   • COLONOSCOPY  8/28/08    Gets through [facility], repeat 5 years   • COLONOSCOPY  5/22/13    repeat 5 years   • COLONOSCOPY  6/13/14    polyps, internal hemorrhoids, divertiulosis   • COLONOSCOPY   10/14/15    Ascending polyp, diverticulosis coli, internal hemorrhoids.   • COLONOSCOPY DIAGNOSTIC  8/1/2016    WojciechbrittneyJaiden-Ascending colon ulcer, without signs of active bleeding.Scattered diverticulosis. Internal hemorrhoids.   • COLONOSCOPY W/ POLYPECTOMY  07/20/2016    repeat in 1 years, 2 polyps due July 2017   • ESOPHAGOGASTRODUODENOSCOPY      Gets through [facility]   • ESOPHAGOGASTRODUODENOSCOPY  5/22/13    chronic gastritis   • ESOPHAGOGASTRODUODENOSCOPY  9/23/14    GERD, esophageal stricture, hiatal hernia.   • ESOPHAGOGASTRODUODENOSCOPY  10/14/15    GE reflux, esophageal stricture, hiatal hernia, gastritis and duodenitis.   • ESOPHAGOGASTRODUODENOSCOPY  8/1/16    [facility] Husain   • EYE SURGERY  in the  1960's    Right- had wrench in eye   • EYE SURGERY Left 07/07/2016    left cataract with IOL   • HB CYSTOSCOPY  1/1/1990   • HB CYSTOSCOPY  4/1/13    Dr. Olivas- normal   • HERNIA REPAIR  1/1/11990    Right Inguinal   • HERNIA REPAIR  1/1/2008    left inguinal   • LEFT HEART CATH,PERCUTANEOUS  03/01/2011   • PTCA  3/1/2011   • SERVICE TO DERMATOLOGY  6/5/14    Skin exam   • US CAROTID COMPLETE BILAT  10/13/14    50% plaque on right          Social History   Substance Use Topics   • Smoking status: Never Smoker   • Smokeless tobacco: Never Used   • Alcohol use No     Drug use:    Drug Use:    No              Family History - Reviewed    Current Outpatient Prescriptions   Medication Sig Dispense Refill   • pravastatin (PRAVACHOL) 10 MG tablet Take 1 tablet by mouth daily. 30 tablet 12   • NON FORMULARY Take 1,200 mg by mouth daily. [place]     • warfarin (COUMADIN) 5 MG tablet TAKE 1 AND 1/2 TABLETS (7.5 MG) BY MOUTH DAILY. 45 tablet 5   • aspirin 81 MG tablet Take 1 tablet by mouth daily.     • ranitidine (ZANTAC) 150 MG tablet Take 150 mg by mouth as directed. 1 tab in AM, 2 tabs at night     • calcium carbonate (TUMS) 500 MG chewable tablet Chew 1 tablet by mouth as needed for Heartburn.     •  acetaminophen (TYLENOL) 500 MG tablet Take 2 tablets (1,000 mg) nightly as needed     • Valerian Root 100 MG Cap Take 1 capsule by mouth nightly as needed.     • melatonin 3 MG Tab Take 3 mg by mouth nightly as needed.     • finasteride (PROSCAR) 5 MG tablet Take 1 tablet by mouth daily.     • NON FORMULARY Take 545 mg by mouth daily. Jade Kuhn       No current facility-administered medications for this visit.         Medicare Health Risk Assessment in electronic health record reviewed.  The following items were identified and addressed:    No risks were identified  Vision and hearing screens documented:  Noted  Advance Directive: Patient has an Advance Directives document, which is on file  Cognitive Assessment: no evidence of cognitive dysfunction by direct observation  Depression Screening performed. Screening time with patient was <1 minutes.       Needed Screening/Treatment:   Immunizations reviewed and patient needs: Influenza    Needed follow up:  None    ------------------------------------------------------------------    REVIEW OF SYSTEMS:     Constitutional:  weight loss, weight gain or night sweats.  HAS FATIGUE.   Eyes:  Blurred vision or double vision   HENT:   hoarseness or difficult or painful swallowing.  HAS PROLONGED RINGING IN EARS AND CONGESTION.  HAS TROUBLES SWALLOWING PILLS.   Respiratory:  Coughing up blood, chronic cough, wheezing, or shortness of breath.  Cardiovascular:  Chest discomfort with exercise, chest pain, palpitations, or ankle swelling.   REPORTS LEG CRAMPS WITH WALKING.   GI: vomiting of blood, tarry black stools, recurrent abdominal pain, nausea or vomiting, frequent diarrhea or rectal bleeding. HAS HEARTBURN AND CONSTIPATION.   :  brown or bloody urine, or urinary urgency  BUT HAS DIFFICULT URINATION, FREQUENCY, AND DIFFICULT TIME STARTING STREAM.   Male: Discharge from penis, sores on the penis, swollen or painful testicles.    Musculoskeletal: neck pains, or back  trouble.  HAS SWOLLEN PAINFUL JOINTS.   Integument:  Recurrent skin rash or moles that have changed in size or color.  Neurologic:  Frequent or severe headaches, or recurrent numbness or tingling. HAS SPELLS OF DIZZINESS AND WEAKNESS, IMPROVED SINCE STOPPING METOPROLOL.  Endocrine:  Heat or cold intolerance, excessive thirst, or excessive urination  Lymphatic:  Swollen glands or lymph nodes.  Psychiatric: increased mood swings, prolonged periods of depression, suicidal thoughts, BUT HAS TROUBLES difficulty with concentration, WORRYING, INSOMNIA, AND WORRYING.      PHYSICAL EXAM:    Visit Vitals  /70 (BP Location: [facility], Patient Position: Sitting, Cuff Size: Regular)   Pulse 76   Temp 98 °F (36.7 °C) (Tympanic)   Ht 5' 7.52\" (1.715 m)   Wt 78 kg   BMI 26.53 kg/m²       GENERAL APPEARANCE: Appears stated age, is in no apparent distress, is well developed and well nourished.  SKIN: Normal color for ethnicity, normal texture, good turgor, no skin rashes, no atypical-appearing skin lesions, no bruises.  LYMPH NODES: No cervical or supraclavicular adenopathy.  HEAD: Normocephalic.  EARS: Pinnae and external ears are normal bilaterally, external auditory canals are normal, tympanic membranes are normal, there is no tragal tenderness, auditory acuity is grossly intact.  See above.   NOSE: External nose is normal to inspection, no septal deviation.  MOUTH/THROAT: Tongue is midline and appears normal, oropharynx appears normal, soft palate and uvula are normal, oral mucosa is normal.    NECK: Neck is supple, no thyromegaly, trachea is midline.  CHEST: Configuration normal, nontender to palpation, respiratory effort is not labored.  LUNGS: Lungs are clear to auscultation with normal inspiratory/expiratory sounds, no rales, no rhonchi, no wheezes.  CARDIOVASCULAR: Normal point of maximal impulse, normal rate and rhythm, no palpable heaves or thrills, S1 and S2 normal, no S3 or S4, no murmurs, no extra heart sounds.   No  carotid or abdominal bruits.  ABDOMEN: Abdomen is soft, normal active bowel sounds, nontender, without masses, without hepatomegaly or splenomegaly, no pulsatile masses.  BACK: Inspection shows normal curvature, no discomfort to palpation of the midline, no costovertebral angle discomfort to palpation.  EXTREMITIES: No clubbing, no cyanosis, no edema, normal muscle tone and development bilaterally.  NEUROLOGIC: Alert, appropriate, oriented x 3.  Speech fluent.   PSYCHIATRIC:  Affect appropriate, insight fair, mood good.    Malcom was seen today for medicare wellness visit.    Diagnoses and all orders for this visit:    Medicare annual wellness visit, subsequent  POA on file  Colonoscopy per GI prn risks outweigh benefit.   PSA will be ordered  Continue with regular eye exams  Continue with regular dental exams  Influenza vaccine given     Depression screening  Controlled.  No need for medication.   Chronic anticoagulation  Chronic deep vein thrombosis (DVT) of other vein of both lower extremities (CMS/HCC)  Has seen hematology and work up negative.  Recommend he continue long term therapy due to repeat blood clots. INR being checked monthly.  Has been stable.  Gets lab draws [facility].     MCI (mild cognitive impairment)  This is stable.  Monitor.  Had testing done at neuropsychiatry.       Hearing loss of left ear, unspecified hearing loss type  Will continue to monitor.  Defer audiology exam at this time.     Gastroesophageal reflux disease without esophagitis  Continue with zantac per orders.  Had normal EDG 8/1/16.      Generalized anxiety disorder  He defers treatment.  Has been given zoloft and this did help.    Pure hypercholesterolemia  He was started on pravastatin 10 mg daily. Lipid has not been checked since. Started in June 2017.   Essential hypertension, benign  Controlled without medication.  Will monitor.      Coronary atherosclerosis due to lipid rich plaque  Stable.  Continue to follow cardiology.      Benign prostatic hypertrophy with incomplete bladder emptying  Stable.  Continue to follow urology.  He gets proscar through urology.     Need for immunization against influenza  -     INFLUENZA ENHANCED / HIGH DOSE SPLIT VIRUS PRES FREE IM VACCINE        See orders.   See Patient Instructions section.   Return in about 1 year (around 9/19/2018) for Medicare Wellness Visit.   AROM

## 2021-02-25 NOTE — BRIEF OPERATIVE NOTE - OPERATION/FINDINGS
Anterior adhesions from bladder and uterus to anterior abdominal wall.  Otherwise normal appearing uterus, tubes and ovaries bilaterally.  Female  delivered in vertex position, clear amniotic fluid, APGARs 7/9, weighing 2760g.  Vaccuum delivery attempted, 2 pop-offs noted and fetus delivered without vaccuum after Maylard incision.

## 2021-02-26 LAB
BASOPHILS # BLD AUTO: 0.01 K/UL — SIGNIFICANT CHANGE UP (ref 0–0.2)
BASOPHILS NFR BLD AUTO: 0.1 % — SIGNIFICANT CHANGE UP (ref 0–1)
EOSINOPHIL # BLD AUTO: 0.07 K/UL — SIGNIFICANT CHANGE UP (ref 0–0.7)
EOSINOPHIL NFR BLD AUTO: 0.8 % — SIGNIFICANT CHANGE UP (ref 0–8)
HCT VFR BLD CALC: 35.4 % — LOW (ref 37–47)
HGB BLD-MCNC: 11.6 G/DL — LOW (ref 12–16)
IMM GRANULOCYTES NFR BLD AUTO: 0.4 % — HIGH (ref 0.1–0.3)
LYMPHOCYTES # BLD AUTO: 1.33 K/UL — SIGNIFICANT CHANGE UP (ref 1.2–3.4)
LYMPHOCYTES # BLD AUTO: 14.3 % — LOW (ref 20.5–51.1)
MCHC RBC-ENTMCNC: 29.1 PG — SIGNIFICANT CHANGE UP (ref 27–31)
MCHC RBC-ENTMCNC: 32.8 G/DL — SIGNIFICANT CHANGE UP (ref 32–37)
MCV RBC AUTO: 88.9 FL — SIGNIFICANT CHANGE UP (ref 81–99)
MONOCYTES # BLD AUTO: 0.65 K/UL — HIGH (ref 0.1–0.6)
MONOCYTES NFR BLD AUTO: 7 % — SIGNIFICANT CHANGE UP (ref 1.7–9.3)
NEUTROPHILS # BLD AUTO: 7.17 K/UL — HIGH (ref 1.4–6.5)
NEUTROPHILS NFR BLD AUTO: 77.4 % — HIGH (ref 42.2–75.2)
NRBC # BLD: 0 /100 WBCS — SIGNIFICANT CHANGE UP (ref 0–0)
PLATELET # BLD AUTO: 183 K/UL — SIGNIFICANT CHANGE UP (ref 130–400)
RBC # BLD: 3.98 M/UL — LOW (ref 4.2–5.4)
RBC # FLD: 14.4 % — SIGNIFICANT CHANGE UP (ref 11.5–14.5)
SARS-COV-2 IGG SERPL QL IA: NEGATIVE — SIGNIFICANT CHANGE UP
SARS-COV-2 IGM SERPL IA-ACNC: 0.07 INDEX — SIGNIFICANT CHANGE UP
WBC # BLD: 9.27 K/UL — SIGNIFICANT CHANGE UP (ref 4.8–10.8)
WBC # FLD AUTO: 9.27 K/UL — SIGNIFICANT CHANGE UP (ref 4.8–10.8)

## 2021-02-26 RX ORDER — IBUPROFEN 200 MG
600 TABLET ORAL EVERY 6 HOURS
Refills: 0 | Status: DISCONTINUED | OUTPATIENT
Start: 2021-02-26 | End: 2021-02-27

## 2021-02-26 RX ADMIN — Medication 600 MILLIGRAM(S): at 17:52

## 2021-02-26 RX ADMIN — Medication 30 MILLIGRAM(S): at 00:03

## 2021-02-26 RX ADMIN — SIMETHICONE 80 MILLIGRAM(S): 80 TABLET, CHEWABLE ORAL at 17:50

## 2021-02-26 RX ADMIN — Medication 975 MILLIGRAM(S): at 14:37

## 2021-02-26 RX ADMIN — Medication 600 MILLIGRAM(S): at 12:32

## 2021-02-26 RX ADMIN — Medication 600 MILLIGRAM(S): at 23:56

## 2021-02-26 RX ADMIN — Medication 600 MILLIGRAM(S): at 05:12

## 2021-02-26 RX ADMIN — SIMETHICONE 80 MILLIGRAM(S): 80 TABLET, CHEWABLE ORAL at 14:36

## 2021-02-26 RX ADMIN — Medication 975 MILLIGRAM(S): at 08:25

## 2021-02-26 RX ADMIN — SIMETHICONE 80 MILLIGRAM(S): 80 TABLET, CHEWABLE ORAL at 10:29

## 2021-02-26 RX ADMIN — SIMETHICONE 80 MILLIGRAM(S): 80 TABLET, CHEWABLE ORAL at 05:11

## 2021-02-26 RX ADMIN — Medication 975 MILLIGRAM(S): at 21:36

## 2021-02-26 RX ADMIN — ENOXAPARIN SODIUM 40 MILLIGRAM(S): 100 INJECTION SUBCUTANEOUS at 12:33

## 2021-02-26 RX ADMIN — Medication 100 MILLIGRAM(S): at 00:03

## 2021-02-26 NOTE — DISCHARGE NOTE OB - MEDICATION SUMMARY - MEDICATIONS TO TAKE
I will START or STAY ON the medications listed below when I get home from the hospital:    Naprosyn 500 mg oral tablet  -- 1 tab(s) by mouth every 12 hours   -- Check with your doctor before becoming pregnant.  May cause drowsiness or dizziness.  Obtain medical advice before taking any non-prescription drugs as some may affect the action of this medication.  Take with food or milk.    -- Indication: For pain    acetaminophen 325 mg oral tablet  -- 3 tab(s) by mouth   -- Indication: For pain    ibuprofen 600 mg oral tablet  -- 1 tab(s) by mouth every 6 hours  -- Indication: For pain    Robaxin-750 oral tablet  -- 1 tab(s) by mouth 3 times a day   -- May cause drowsiness.  Alcohol may intensify this effect.  Use care when operating dangerous machinery.    -- Indication: For pain

## 2021-02-26 NOTE — PROGRESS NOTE ADULT - SUBJECTIVE AND OBJECTIVE BOX
TYE WIN  35y  Female    PGY2 Note:  Patient seen and examined bedside. No overnight events. Denies heavy vaginal bleeding. Pain well controlled. Ambulating to chair without difficulty. Tolerating clear liquid diet, simmons in place, not yet passing flatus, no BM. Bottle-feeding.     Physical Exam  Vital Signs Last 24 Hrs  T(C): 36.6 (2021 04:00), Max: 36.9 (2021 06:57)  T(F): 97.8 (2021 04:00), Max: 98.4 (2021 06:57)  HR: 97 (2021 04:00) (58 - 104)  BP: 140/87 (2021 04:00) (117/66 - 140/87) first elevated  @0600  RR: 18 (2021 04:00) (18 - 20)  SpO2: 100% (2021 19:02) (77% - 100%)    UO: (9519-4623) 1200cc, 171cc/hr, adequate 43cc/hr    Gen: NAD, sitting comfortably  CV: RRR. No murmurs gallops or rubs.  Pulm: CTAB. No wheezes or rales.  Ext: No calf tenderness, no swelling.   Abd: Nondistended, soft, nontender, BS+, fundus firm, and below umbilicus.   Lochia: Minimal rubra  Wound: Dressing changed. Pfannenstiel skin incision clean, dry intact. Steris in place. No surrounding edema or erythema.    PAST MEDICAL & SURGICAL HISTORY:  Intractable migraine with status migrainosus, unspecified migraine type  Migraine without status migrainosus, not intractable, unspecified migraine type  History of dilatation and curettage  H/O ovarian cystectomy  History of  section    Diet: clear liquid    Labs:                        13.1   6.05  )-----------( 209      ( 2021 07:46 )             38.2      UDS neg  RPR NR  O pos, antibody neg  COVID-19 neg    MEDICATIONS  (STANDING):  acetaminophen   Tablet .. 975 milliGRAM(s) Oral <User Schedule>  enoxaparin Injectable 40 milliGRAM(s) SubCutaneous daily  ibuprofen  Tablet. 600 milliGRAM(s) Oral every 6 hours  lactated ringers. 1000 milliLiter(s) (125 mL/Hr) IV Continuous <Continuous>  measles/mumps/rubella Vaccine 0.5 milliLiter(s) SubCutaneous once  morphine PF Spinal 0.2 milliGRAM(s) IntraThecal. once  oxytocin Infusion 333.333 milliUNIT(s)/Min (1000 mL/Hr) IV Continuous <Continuous>  oxytocin Infusion 333.333 milliUNIT(s)/Min (1000 mL/Hr) IV Continuous <Continuous>  simethicone 80 milliGRAM(s) Chew every 4 hours    MEDICATIONS  (PRN):  dexAMETHasone  Injectable 4 milliGRAM(s) IV Push every 6 hours PRN Nausea  diphenhydrAMINE 25 milliGRAM(s) Oral every 6 hours PRN Pruritus  HYDROmorphone  Injectable 0.5 milliGRAM(s) IV Push every 10 minutes PRN Moderate Pain (4 - 6)  lanolin Ointment 1 Application(s) Topical every 6 hours PRN Sore Nipples  magnesium hydroxide Suspension 30 milliLiter(s) Oral two times a day PRN Constipation  naloxone Injectable 0.1 milliGRAM(s) IV Push every 3 minutes PRN For ANY of the following changes in patient status:  A. Breaths Per Minute LESS THAN 10, B. Oxygen saturation LESS THAN 90%, C. Sedation score of 6 for Stop After: 4 Times  ondansetron Injectable 4 milliGRAM(s) IV Push every 6 hours PRN Nausea  oxyCODONE    IR 5 milliGRAM(s) Oral every 3 hours PRN Moderate to Severe Pain (4-10)  oxyCODONE    IR 5 milliGRAM(s) Oral once PRN Moderate to Severe Pain (4-10)            TYE WIN  35y  Female    PGY2 Note:  Patient seen and examined bedside. No overnight events. Denies heavy vaginal bleeding. Pain well controlled. Ambulating to chair without difficulty. Tolerating clear liquid diet, simmons in place, not yet passing flatus, no BM. Bottle-feeding.     Physical Exam  Vital Signs Last 24 Hrs  T(C): 36.6 (2021 04:00), Max: 36.9 (2021 06:57)  T(F): 97.8 (2021 04:00), Max: 98.4 (2021 06:57)  HR: 97 (2021 04:00) (58 - 104)  BP: 140/87 (2021 04:00) (117/66 - 140/87) first elevated  @0600  RR: 18 (2021 04:00) (18 - 20)  SpO2: 100% (2021 19:02) (77% - 100%)    UO: (6190-6314) 1200cc, 171cc/hr, adequate 43cc/hr    Gen: NAD, sitting comfortably  CV: RRR. No murmurs gallops or rubs.  Pulm: CTAB. No wheezes or rales.  Ext: No calf tenderness, no swelling.   Abd: Moderately distended, soft, nontender, BS+, fundus firm, and below umbilicus.   Lochia: Minimal rubra  Wound: Dressing changed. Pfannenstiel skin incision clean, dry intact. Steris in place. No surrounding edema or erythema.    PAST MEDICAL & SURGICAL HISTORY:  Intractable migraine with status migrainosus, unspecified migraine type  Migraine without status migrainosus, not intractable, unspecified migraine type  History of dilatation and curettage  H/O ovarian cystectomy  History of  section    Diet: clear liquid    Labs:                        13.1   6.05  )-----------( 209      ( 2021 07:46 )             38.2      UDS neg  RPR NR  O pos, antibody neg  COVID-19 neg    MEDICATIONS  (STANDING):  acetaminophen   Tablet .. 975 milliGRAM(s) Oral <User Schedule>  enoxaparin Injectable 40 milliGRAM(s) SubCutaneous daily  ibuprofen  Tablet. 600 milliGRAM(s) Oral every 6 hours  lactated ringers. 1000 milliLiter(s) (125 mL/Hr) IV Continuous <Continuous>  measles/mumps/rubella Vaccine 0.5 milliLiter(s) SubCutaneous once  morphine PF Spinal 0.2 milliGRAM(s) IntraThecal. once  oxytocin Infusion 333.333 milliUNIT(s)/Min (1000 mL/Hr) IV Continuous <Continuous>  oxytocin Infusion 333.333 milliUNIT(s)/Min (1000 mL/Hr) IV Continuous <Continuous>  simethicone 80 milliGRAM(s) Chew every 4 hours    MEDICATIONS  (PRN):  dexAMETHasone  Injectable 4 milliGRAM(s) IV Push every 6 hours PRN Nausea  diphenhydrAMINE 25 milliGRAM(s) Oral every 6 hours PRN Pruritus  HYDROmorphone  Injectable 0.5 milliGRAM(s) IV Push every 10 minutes PRN Moderate Pain (4 - 6)  lanolin Ointment 1 Application(s) Topical every 6 hours PRN Sore Nipples  magnesium hydroxide Suspension 30 milliLiter(s) Oral two times a day PRN Constipation  naloxone Injectable 0.1 milliGRAM(s) IV Push every 3 minutes PRN For ANY of the following changes in patient status:  A. Breaths Per Minute LESS THAN 10, B. Oxygen saturation LESS THAN 90%, C. Sedation score of 6 for Stop After: 4 Times  ondansetron Injectable 4 milliGRAM(s) IV Push every 6 hours PRN Nausea  oxyCODONE    IR 5 milliGRAM(s) Oral every 3 hours PRN Moderate to Severe Pain (4-10)  oxyCODONE    IR 5 milliGRAM(s) Oral once PRN Moderate to Severe Pain (4-10)

## 2021-02-26 NOTE — DISCHARGE NOTE OB - CARE PROVIDER_API CALL
Agus Jose)  Obstetrics and Gynecology  [address] [place] [address]  Phone: [phone]  Fax: [phone]  Follow Up Time: 1 week

## 2021-02-26 NOTE — DISCHARGE NOTE OB - CARE PLAN
Principal Discharge DX:	 delivery delivered  Goal:	Healthy and happy mom and baby  Assessment and plan of treatment:	No heavy lifting.   Nothing inside the vagina for 6 weeks: no tampons, douching, sexual intercourse, tub baths or pools.   If you have a fever over 100.4F, severe abdominal pain or heavy vaginal bleeding please call your doctor or go to the emergency room.  Please follow up with your OBGYN in 1 week for incision check and in 6 weeks for a postpartum visit.

## 2021-02-26 NOTE — DISCHARGE NOTE OB - ADDITIONAL INSTRUCTIONS
Please follow up with your doctor in 6 weeks for your postpartum visit and 1 week for your incision check

## 2021-02-26 NOTE — DISCHARGE NOTE OB - PATIENT PORTAL LINK FT
You can access the FollowMyHealth Patient Portal offered by [facility] by registering at the following website: [email or web address]. By joining [employer]’s FollowMyHealth portal, you will also be able to view your health information using other applications (apps) compatible with our system.

## 2021-02-26 NOTE — DISCHARGE NOTE OB - PLAN OF CARE
Healthy and happy mom and baby No heavy lifting.   Nothing inside the vagina for 6 weeks: no tampons, douching, sexual intercourse, tub baths or pools.   If you have a fever over 100.4F, severe abdominal pain or heavy vaginal bleeding please call your doctor or go to the emergency room.  Please follow up with your OBGYN in 1 week for incision check and in 6 weeks for a postpartum visit.

## 2021-02-27 VITALS
RESPIRATION RATE: 18 BRPM | DIASTOLIC BLOOD PRESSURE: 67 MMHG | TEMPERATURE: 98 F | HEART RATE: 93 BPM | SYSTOLIC BLOOD PRESSURE: 130 MMHG

## 2021-02-27 RX ORDER — INFLUENZA VIRUS VACCINE 15; 15; 15; 15 UG/.5ML; UG/.5ML; UG/.5ML; UG/.5ML
0.5 SUSPENSION INTRAMUSCULAR ONCE
Refills: 0 | Status: COMPLETED | OUTPATIENT
Start: 2021-02-27 | End: 2021-02-27

## 2021-02-27 RX ORDER — IBUPROFEN 200 MG
1 TABLET ORAL
Qty: 0 | Refills: 0 | DISCHARGE
Start: 2021-02-27

## 2021-02-27 RX ORDER — ACETAMINOPHEN 500 MG
3 TABLET ORAL
Qty: 0 | Refills: 0 | DISCHARGE
Start: 2021-02-27

## 2021-02-27 RX ADMIN — Medication 600 MILLIGRAM(S): at 12:39

## 2021-02-27 RX ADMIN — SIMETHICONE 80 MILLIGRAM(S): 80 TABLET, CHEWABLE ORAL at 04:23

## 2021-02-27 RX ADMIN — ENOXAPARIN SODIUM 40 MILLIGRAM(S): 100 INJECTION SUBCUTANEOUS at 12:39

## 2021-02-27 RX ADMIN — Medication 975 MILLIGRAM(S): at 14:36

## 2021-02-27 RX ADMIN — Medication 975 MILLIGRAM(S): at 03:52

## 2021-02-27 RX ADMIN — INFLUENZA VIRUS VACCINE 0.5 MILLILITER(S): 15; 15; 15; 15 SUSPENSION INTRAMUSCULAR at 10:28

## 2021-02-27 RX ADMIN — Medication 0.5 MILLILITER(S): at 07:31

## 2021-02-27 RX ADMIN — Medication 600 MILLIGRAM(S): at 06:21

## 2021-02-27 RX ADMIN — SIMETHICONE 80 MILLIGRAM(S): 80 TABLET, CHEWABLE ORAL at 06:46

## 2021-02-27 NOTE — PROGRESS NOTE ADULT - ATTENDING COMMENTS
as above, pt seen at bedside, agree with findings, impression and plan    IMP: s/p LTCS - POD # 2 - doing Well    Plan: dc home, NSAID's/PNV's, f/u office - 1 wk - wound check
as above, pt seen at bedside, agree with findings, impression and plan    IMP: s/p RPT LTCS - POD # 1 - stable    Plan: dc simmons, OOB/Ambulation, Pain management, Regular Diet, f/u labs

## 2021-02-27 NOTE — PROGRESS NOTE ADULT - ASSESSMENT
A/P: 34yo P2 s/p repeat LTCS for labor at term, POD#1, recovering well   -ambulation encouraged  -PO hydration encouraged  -clear liquid diet, advance to regular after flatus  -ancef x2 doses postop  -lovenox ordered for DVT prophylaxis  -Incentive Spirometry encouraged  -pain management per routine  -UO adequate, simmons removed, TOV 1200  -f/u AM CBC     Dr. Jose and Dr. Cardona to be made aware.
A/P: 36yo P2 s/p repeat LTCS for labor at term, POD#2, recovering well     - encourage ambulation, PO hydration  - monitor vitals, bleeding   - regular diet  - encourage incentive spirometry   - pain mgmt prn   - simethicone  - routine postop care   - lovenox   - anticipate d/c home     Dr. Jose and Dr. Rodriguez to be made aware
A/P: 36yo P2 s/p repeat LTCS for labor at term, POD0, recovering well   -ambulation encouraged  -PO hydration encouraged  -clear liquid diet, advance to regular after flatus  -Ancef x2 doses postop  -lovenox ordered for DVT prophylaxis  -Incentive Spirometry at bedside and encouraged  -pain management per routine  -UO adequate, simmons in until AM  -f/u AM CBC       Dr. Jose and Dr. Michelle to be made aware.

## 2021-02-27 NOTE — PROGRESS NOTE ADULT - SUBJECTIVE AND OBJECTIVE BOX
PGY1 Note:  Patient seen and examined at bedside, recovering well, no acute complaints. Denies fever, chills, chest pain, SOB, nausea, vomiting, LE pain. Pain well-controlled with medication. Minimal bleeding, voiding, ambulating, passed flatus, tolerating regular diet. Breastfeeding.    Physical Exam  Vital Signs Last 24 Hrs  T(C): 36.3 (27 Feb 2021 03:22), Max: 36.9 (26 Feb 2021 23:27)  T(F): 97.4 (27 Feb 2021 03:22), Max: 98.5 (26 Feb 2021 23:27)  HR: 80 (27 Feb 2021 03:22) (80 - 97)  BP: 136/79 (27 Feb 2021 03:22) (120/81 - 138/79)  RR: 18 (27 Feb 2021 03:22) (18 - 19)    Gen: AAOx3, NAD  Heart: Normal S1S2, RRR, no m/r/g  Lungs: CTA b/l, no r/r/w   Fundus: firm, below umbilicus   Wound: steris in place c/d/i   Abd: Soft, nontender, nondistended, BS+  Lochia: minimal  Ext: No calf tenderness, no swelling    Labs:                        11.6   9.27  )-----------( 183      ( 26 Feb 2021 06:24 )             35.4                         13.1   6.05  )-----------( 209      ( 25 Feb 2021 07:46 )             38.2        acetaminophen   Tablet .. 975 milliGRAM(s) Oral <User Schedule>  dexAMETHasone  Injectable 4 milliGRAM(s) IV Push every 6 hours PRN  diphenhydrAMINE 25 milliGRAM(s) Oral every 6 hours PRN  enoxaparin Injectable 40 milliGRAM(s) SubCutaneous daily  HYDROmorphone  Injectable 0.5 milliGRAM(s) IV Push every 10 minutes PRN  ibuprofen  Tablet. 600 milliGRAM(s) Oral every 6 hours  lactated ringers. 1000 milliLiter(s) IV Continuous <Continuous>  lanolin Ointment 1 Application(s) Topical every 6 hours PRN  magnesium hydroxide Suspension 30 milliLiter(s) Oral two times a day PRN  measles/mumps/rubella Vaccine 0.5 milliLiter(s) SubCutaneous once  morphine PF Spinal 0.2 milliGRAM(s) IntraThecal. once  naloxone Injectable 0.1 milliGRAM(s) IV Push every 3 minutes PRN  ondansetron Injectable 4 milliGRAM(s) IV Push every 6 hours PRN  oxyCODONE    IR 5 milliGRAM(s) Oral every 3 hours PRN  oxyCODONE    IR 5 milliGRAM(s) Oral once PRN  oxytocin Infusion 333.333 milliUNIT(s)/Min IV Continuous <Continuous>  oxytocin Infusion 333.333 milliUNIT(s)/Min IV Continuous <Continuous>  simethicone 80 milliGRAM(s) Chew every 4 hours

## 2021-03-01 ENCOUNTER — APPOINTMENT (OUTPATIENT)
Dept: MATERNAL FETAL MEDICINE | Facility: CLINIC | Age: 36
End: 2021-03-01

## 2021-03-02 DIAGNOSIS — O34.211 MATERNAL CARE FOR LOW TRANSVERSE SCAR FROM PREVIOUS CESAREAN DELIVERY: ICD-10-CM

## 2021-03-02 DIAGNOSIS — Z23 ENCOUNTER FOR IMMUNIZATION: ICD-10-CM

## 2021-03-02 DIAGNOSIS — Z3A.38 38 WEEKS GESTATION OF PREGNANCY: ICD-10-CM

## 2021-03-02 DIAGNOSIS — Z86.19 PERSONAL HISTORY OF OTHER INFECTIOUS AND PARASITIC DISEASES: ICD-10-CM

## 2021-03-02 DIAGNOSIS — G43.909 MIGRAINE, UNSPECIFIED, NOT INTRACTABLE, WITHOUT STATUS MIGRAINOSUS: ICD-10-CM

## 2021-12-07 NOTE — OB RN TRIAGE NOTE - CHIEF COMPLAINT QUOTE
Cough in Children: Care Instructions  Your Care Instructions  A cough is how your child's body responds to something that bothers his or her throat or airways. Many things can cause a cough. Your child might cough because of a cold or the flu, bronchitis, or asthma. Cigarette smoke, postnasal drip, allergies, and stomach acid that backs up into the throat also can cause coughs. A cough is a symptom, not a disease. Most coughs stop when the cause, such as a cold, goes away. You can take a few steps at home to help your child cough less and feel better. Follow-up care is a key part of your child's treatment and safety. Be sure to make and go to all appointments, and call your doctor if your child is having problems. It's also a good idea to know your child's test results and keep a list of the medicines your child takes. How can you care for your child at home? · Have your child drink plenty of water and other fluids. This may help soothe a dry or sore throat. Honey or lemon juice in hot water or tea may ease a dry cough. Do not give honey to a child younger than 3year old. It may contain bacteria that are harmful to infants. · Be careful with cough and cold medicines. Don't give them to children younger than 6, because they don't work for children that age and can even be harmful. For children 6 and older, always follow all the instructions carefully. Make sure you know how much medicine to give and how long to use it. And use the dosing device if one is included. · Keep your child away from smoke. Do not smoke or let anyone else smoke around your child or in your house. · Help your child avoid exposure to smoke, dust, or other pollutants, or have your child wear a face mask. Check with your doctor or pharmacist to find out which type of face mask will give your child the most benefit. When should you call for help? Call 911 anytime you think your child may need emergency care.  For example, call if:    · Your child has severe trouble breathing. Symptoms may include:  ? Using the belly muscles to breathe. ? The chest sinking in or the nostrils flaring when your child struggles to breathe.     · Your child's skin and fingernails are gray or blue.     · Your child coughs up large amounts of blood or what looks like coffee grounds. Call your doctor now or seek immediate medical care if:    · Your child coughs up blood.     · Your child has new or worse trouble breathing.     · Your child has a new or higher fever. Watch closely for changes in your child's health, and be sure to contact your doctor if:    · Your child has a new symptom, such as an earache or a rash.     · Your child coughs more deeply or more often, especially if you notice more mucus or a change in the color of the mucus.     · Your child does not get better as expected. Where can you learn more? Go to http://www.gray.com/  Enter Q994 in the search box to learn more about \"Cough in Children: Care Instructions. \"  Current as of: July 6, 2021               Content Version: 13.0  © 8646-5297 [employer]. Care instructions adapted under license by [employer] (which disclaims liability or warranty for this information). If you have questions about a medical condition or this instruction, always ask your healthcare professional. [address] any warranty or liability for your use of this information. Diarrhea in Children: Care Instructions  Your Care Instructions     Diarrhea is loose, watery stools (bowel movements). Your child gets diarrhea when the intestines push stools through before the body can soak up the water in the stools. It causes your child to have bowel movements more often. Almost everyone has diarrhea now and then. It usually isn't serious. Diarrhea often is the body's way of getting rid of the bacteria or toxins that cause the diarrhea.  But if your child has diarrhea, watch him or her closely. Children can get dehydrated quickly if they lose too much fluid through diarrhea. Sometimes they can't drink enough fluids to replace lost fluids. The doctor has checked your child carefully, but problems can develop later. If you notice any problems or new symptoms, get medical treatment right away. Follow-up care is a key part of your child's treatment and safety. Be sure to make and go to all appointments, and call your doctor if your child is having problems. It's also a good idea to know your child's test results and keep a list of the medicines your child takes. How can you care for your child at home? · Watch for and treat signs of dehydration, which means the body has lost too much water. As your child becomes dehydrated, thirst increases, and his or her mouth or eyes may feel very dry. Your child may also lack energy and want to be held a lot. He or she will not need to urinate as often as usual.  · Offer your child his or her usual foods. Your child will likely be able to eat those foods within a day or two after being sick. · If your child is dehydrated, give him or her an oral rehydration solution, such as Pedialyte or Infalyte, to replace fluid lost from diarrhea. These drinks contain the right mix of salt, sugar, and minerals to help correct dehydration. You can buy them at drugstores or grocery stores in the baby care section. Give these drinks to your child as long as he or she has diarrhea. Do not use these drinks as the only source of liquids or food for more than 12 to 24 hours. · Do not give your child over-the-counter antidiarrhea or upset-stomach medicines without talking to your doctor first. Evelinakhang Macdonald not give bismuth (Pepto-Bismol) or other medicines that contain salicylates, a form of aspirin, or aspirin. Aspirin has been linked to Reye syndrome, a serious illness. · Wash your hands after you change diapers and before you touch food.  Have your child wash his or her hands after using the toilet and before eating. · Make sure that your child rests. Keep your child at home as long as he or she has a fever. · If your child is younger than age 3 or weighs less than 24 pounds, follow your doctor's advice about the amount of medicine to give your child. When should you call for help? Call 911 anytime you think your child may need emergency care. For example, call if:    · Your child passes out (loses consciousness).     · Your child is confused, does not know where he or she is, or is extremely sleepy or hard to wake up.     · Your child passes maroon or very bloody stools. Call your doctor now or seek immediate medical care if:    · Your child has signs of needing more fluids. These signs include sunken eyes with few tears, a dry mouth with little or no spit, and little or no urine for 8 or more hours.     · Your child has new or worse belly pain.     · Your child's stools are black and look like tar, or they have streaks of blood.     · Your child has a new or higher fever.     · Your child has severe diarrhea. (This means large, loose bowel movements every 1 to 2 hours.)   Watch closely for changes in your child's health, and be sure to contact your doctor if:    · Your child's diarrhea is getting worse.     · Your child is not getting better after 2 days (48 hours).     · You have questions or are worried about your child's illness. Where can you learn more? Go to http://www.gray.com/  Enter L355 in the search box to learn more about \"Diarrhea in Children: Care Instructions. \"  Current as of: July 1, 2021               Content Version: 13.0  © 4266-7772 [employer]. Care instructions adapted under license by [employer] (which disclaims liability or warranty for this information).  If you have questions about a medical condition or this instruction, always ask your healthcare professional. [employer] disclaims any warranty or liability for your use of this information.     [address] [facility]  Intake: [phone] c/o ctx scheduled rcs for tomorrow

## 2022-03-11 NOTE — ED ADULT NURSE NOTE - TEMPLATE
Spoke with Jose Juan Olvera at [facility] did received medication order.     Called patient to inform him left message and sent MyChart message General

## 2022-05-04 NOTE — OB RN INTRAOPERATIVE NOTE - NS_CAUTERYPENNUMBER_OBGYN_ALL_OB_FT
May 4, 2022    Clotilde Dwyer  [address]  [place] [facility] [address]             [facility]  Pediatrics  [address]  [facility] [address]  Phone: [phone]   May 4, 2022     Patient: Clotilde Dwyer   YOB: 2017   Date of Visit: 5/4/2022       To Whom it May Concern:    Clotilde Dwyer was seen in my clinic on 5/4/2022. She may return to school on 5/4/2022.  Please excuse her for 5/3/22 as well..    Please excuse her from any classes or work missed.    If you have any questions or concerns, please don't hesitate to call.    Sincerely,           Venessa Palacios MD      lot [phone]

## 2022-12-21 ENCOUNTER — EMERGENCY (EMERGENCY)
Facility: HOSPITAL | Age: 37
LOS: 0 days | Discharge: HOME | End: 2022-12-22
Attending: EMERGENCY MEDICINE | Admitting: EMERGENCY MEDICINE
Payer: MEDICAID

## 2022-12-21 VITALS
WEIGHT: 149.91 LBS | RESPIRATION RATE: 18 BRPM | OXYGEN SATURATION: 100 % | SYSTOLIC BLOOD PRESSURE: 132 MMHG | TEMPERATURE: 98 F | HEIGHT: 65 IN | DIASTOLIC BLOOD PRESSURE: 82 MMHG | HEART RATE: 103 BPM

## 2022-12-21 DIAGNOSIS — Z98.890 OTHER SPECIFIED POSTPROCEDURAL STATES: Chronic | ICD-10-CM

## 2022-12-21 DIAGNOSIS — R51.9 HEADACHE, UNSPECIFIED: ICD-10-CM

## 2022-12-21 DIAGNOSIS — L76.34 POSTPROCEDURAL SEROMA OF SKIN AND SUBCUTANEOUS TISSUE FOLLOWING OTHER PROCEDURE: ICD-10-CM

## 2022-12-21 DIAGNOSIS — Z98.891 HISTORY OF UTERINE SCAR FROM PREVIOUS SURGERY: Chronic | ICD-10-CM

## 2022-12-21 DIAGNOSIS — Z90.6 ACQUIRED ABSENCE OF OTHER PARTS OF URINARY TRACT: ICD-10-CM

## 2022-12-21 DIAGNOSIS — G43.909 MIGRAINE, UNSPECIFIED, NOT INTRACTABLE, WITHOUT STATUS MIGRAINOSUS: ICD-10-CM

## 2022-12-21 PROCEDURE — 99284 EMERGENCY DEPT VISIT MOD MDM: CPT

## 2022-12-21 RX ORDER — SODIUM CHLORIDE 9 MG/ML
1000 INJECTION INTRAMUSCULAR; INTRAVENOUS; SUBCUTANEOUS ONCE
Refills: 0 | Status: COMPLETED | OUTPATIENT
Start: 2022-12-21 | End: 2022-12-21

## 2022-12-21 RX ORDER — METOCLOPRAMIDE HCL 10 MG
10 TABLET ORAL ONCE
Refills: 0 | Status: COMPLETED | OUTPATIENT
Start: 2022-12-21 | End: 2022-12-21

## 2022-12-21 RX ORDER — KETOROLAC TROMETHAMINE 30 MG/ML
15 SYRINGE (ML) INJECTION ONCE
Refills: 0 | Status: DISCONTINUED | OUTPATIENT
Start: 2022-12-21 | End: 2022-12-21

## 2022-12-21 RX ADMIN — Medication 15 MILLIGRAM(S): at 22:41

## 2022-12-21 RX ADMIN — SODIUM CHLORIDE 1000 MILLILITER(S): 9 INJECTION INTRAMUSCULAR; INTRAVENOUS; SUBCUTANEOUS at 22:41

## 2022-12-21 RX ADMIN — Medication 104 MILLIGRAM(S): at 22:41

## 2022-12-21 NOTE — ED ADULT NURSE NOTE - OBJECTIVE STATEMENT
Pt alert and oriented x3. Airway patent with equal respirations. No distress noted. Arom x 4 extremitites. stated she had her BBL performed in [place]. stated she has recently been experiencing headaches. Denies any chest pain, palpitations, dizziness, nausea, vomiting, visual disturbances. Denies HI,SI. Calm and collective.

## 2022-12-21 NOTE — ED ADULT TRIAGE NOTE - CHIEF COMPLAINT QUOTE
Pt c/o migraine x 3 days. Pt states she had BBL 3 weeks ago and went for a massage today and they found a "sonoma" today. denies fever/chills. hx migraines

## 2022-12-22 ENCOUNTER — EMERGENCY (EMERGENCY)
Facility: HOSPITAL | Age: 37
LOS: 0 days | Discharge: HOME | End: 2022-12-22
Attending: EMERGENCY MEDICINE | Admitting: EMERGENCY MEDICINE

## 2022-12-22 VITALS
TEMPERATURE: 98 F | HEART RATE: 78 BPM | HEIGHT: 65 IN | SYSTOLIC BLOOD PRESSURE: 168 MMHG | DIASTOLIC BLOOD PRESSURE: 94 MMHG | RESPIRATION RATE: 18 BRPM | WEIGHT: 149.91 LBS

## 2022-12-22 VITALS
RESPIRATION RATE: 18 BRPM | OXYGEN SATURATION: 100 % | SYSTOLIC BLOOD PRESSURE: 116 MMHG | TEMPERATURE: 98 F | DIASTOLIC BLOOD PRESSURE: 64 MMHG | HEART RATE: 99 BPM

## 2022-12-22 DIAGNOSIS — Z90.721 ACQUIRED ABSENCE OF OVARIES, UNILATERAL: ICD-10-CM

## 2022-12-22 DIAGNOSIS — G43.909 MIGRAINE, UNSPECIFIED, NOT INTRACTABLE, WITHOUT STATUS MIGRAINOSUS: ICD-10-CM
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
## 2022-12-22 DIAGNOSIS — Z98.890 OTHER SPECIFIED POSTPROCEDURAL STATES: Chronic | ICD-10-CM

## 2022-12-22 DIAGNOSIS — D64.9 ANEMIA, UNSPECIFIED: ICD-10-CM

## 2022-12-22 DIAGNOSIS — Z98.891 HISTORY OF UTERINE SCAR FROM PREVIOUS SURGERY: Chronic | ICD-10-CM
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
## 2022-12-22 DIAGNOSIS — S30.1XXA CONTUSION OF ABDOMINAL WALL, INITIAL ENCOUNTER: ICD-10-CM

## 2022-12-22 DIAGNOSIS — R10.9 UNSPECIFIED ABDOMINAL PAIN: ICD-10-CM

## 2022-12-22 LAB
ANION GAP SERPL CALC-SCNC: 8 MMOL/L — SIGNIFICANT CHANGE UP (ref 7–14)
BUN SERPL-MCNC: 9 MG/DL — LOW (ref 10–20)
CALCIUM SERPL-MCNC: 8.7 MG/DL — SIGNIFICANT CHANGE UP (ref 8.4–10.5)
CHLORIDE SERPL-SCNC: 106 MMOL/L — SIGNIFICANT CHANGE UP (ref 98–110)
CO2 SERPL-SCNC: 24 MMOL/L — SIGNIFICANT CHANGE UP (ref 17–32)
CREAT SERPL-MCNC: 0.6 MG/DL — LOW (ref 0.7–1.5)
EGFR: 117 ML/MIN/1.73M2 — SIGNIFICANT CHANGE UP
GLUCOSE SERPL-MCNC: 102 MG/DL — HIGH (ref 70–99)
HCT VFR BLD CALC: 29.7 % — LOW (ref 37–47)
HGB BLD-MCNC: 9.8 G/DL — LOW (ref 12–16)
MCHC RBC-ENTMCNC: 30.8 PG — SIGNIFICANT CHANGE UP (ref 27–31)
MCHC RBC-ENTMCNC: 33 G/DL — SIGNIFICANT CHANGE UP (ref 32–37)
MCV RBC AUTO: 93.4 FL — SIGNIFICANT CHANGE UP (ref 81–99)
NRBC # BLD: 0 /100 WBCS — SIGNIFICANT CHANGE UP (ref 0–0)
PLATELET # BLD AUTO: 371 K/UL — SIGNIFICANT CHANGE UP (ref 130–400)
POTASSIUM SERPL-MCNC: 3.9 MMOL/L — SIGNIFICANT CHANGE UP (ref 3.5–5)
POTASSIUM SERPL-SCNC: 3.9 MMOL/L — SIGNIFICANT CHANGE UP (ref 3.5–5)
RBC # BLD: 3.18 M/UL — LOW (ref 4.2–5.4)
RBC # FLD: 14.6 % — HIGH (ref 11.5–14.5)
SODIUM SERPL-SCNC: 138 MMOL/L — SIGNIFICANT CHANGE UP (ref 135–146)
WBC # BLD: 6.4 K/UL — SIGNIFICANT CHANGE UP (ref 4.8–10.8)
WBC # FLD AUTO: 6.4 K/UL — SIGNIFICANT CHANGE UP (ref 4.8–10.8)

## 2022-12-22 PROCEDURE — 99284 EMERGENCY DEPT VISIT MOD MDM: CPT

## 2022-12-22 RX ORDER — KETOROLAC TROMETHAMINE 30 MG/ML
30 SYRINGE (ML) INJECTION ONCE
Refills: 0 | Status: DISCONTINUED | OUTPATIENT
Start: 2022-12-22 | End: 2022-12-22

## 2022-12-22 RX ORDER — ACETAMINOPHEN 500 MG
1000 TABLET ORAL ONCE
Refills: 0 | Status: COMPLETED | OUTPATIENT
Start: 2022-12-22 | End: 2022-12-22

## 2022-12-22 RX ADMIN — Medication 30 MILLIGRAM(S): at 23:05

## 2022-12-22 RX ADMIN — Medication 1000 MILLIGRAM(S): at 00:46

## 2022-12-22 RX ADMIN — Medication 30 MILLIGRAM(S): at 22:07

## 2022-12-22 NOTE — ED PROVIDER NOTE - NSICDXPASTSURGICALHX_GEN_ALL_CORE_FT
PAST SURGICAL HISTORY:  H/O ovarian cystectomy     History of  section     History of dilatation and curettage

## 2022-12-22 NOTE — ED PROVIDER NOTE - NSICDXFAMILYHX_GEN_ALL_CORE_FT
Physical Therapy  Facility/Department: [address] PROGRESSIVE CARE  Physical Therapy Treatment Note    Name: Leo Mcnulty  : 1933  MRN: [id number]  Date of Service: 10/25/2022    Discharge Recommendations:  Patient would benefit from continued therapy after discharge (3-5x/wk in whatever setting pt is discharged)   PT Equipment Recommendations  Other: defer to next level of care    Leo Mcnulty scored a 6/ on the AM-PAC short mobility form. Current research shows that an AM-PAC score of 17 or less is typically not associated with a discharge to the patient's home setting. Based on the patient's AM-PAC score and their current functional mobility deficits, it is recommended that the patient have 3-5 sessions per week of Physical Therapy at d/c to increase the patient's independence. Please see assessment section for further patient specific details. If patient discharges prior to next session this note will serve as a discharge summary. Please see below for the latest assessment towards goals. Patient Diagnosis(es): The primary encounter diagnosis was Altered mental status, unspecified altered mental status type. A diagnosis of Hypothermia, initial encounter was also pertinent to this visit. 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 Past Surgical History:  has no past surgical history on file. Assessment   Body Structures, Functions, Activity Limitations Requiring Skilled Therapeutic Intervention: Decreased functional mobility ; Decreased safe awareness;Decreased cognition;Decreased balance  Assessment: Pt is a 79 y/o female with hx of dementia who presented to the ED with AMS. Possible UTI. Pt lives at home with her son and his partner and according to prior therapy notes, pt ambulates with rollator at baseline.  Pt more awake this date but continues to not be able to follow commands for mobility tasks and needs max A of 2 for bed mob and transfers with diamond ryan lift; pt's son intends to take her home at discharge however if they are unable to provide assist pt needs then she may need SNF or LTC  Treatment Diagnosis: functional mobility below  baseline  Therapy Prognosis: Guarded  History: Per Inna Olivia MD \"12 y.o. female who presented to [facility] [place] with altered mental status. Patient was brought in by her son after she has had increasing confusion at home. Patient has some baseline dementia but over the last several days she has been much more disoriented. She has been yelling and hard to control. This is not typical for her. Family reports that this is usually the result of urinary tract infection. \"  Barriers to Learning: cognition, intermittent agitation, [place]  Requires PT Follow-Up: Yes     Plan   Physcial Therapy Plan  General Plan: 3-5 times per week  Current Treatment Recommendations: Balance training, Functional mobility training, Strengthening, ROM, Transfer training, Neuromuscular re-education, Gait training, Endurance training, Safety education & training, Patient/Caregiver education & training, Home exercise program, Equipment evaluation, education, & procurement, Cognitive reorientation  Safety Devices  Type of Devices: Bed alarm in place, Left in bed, Nurse notified, Call light within reach, All bandar prominences offloaded  Restraints  Restraints Initially in Place: No     Restrictions  Restrictions/Precautions  Restrictions/Precautions: Fall Risk (contact precautions from 2021)  Position Activity Restriction  Other position/activity restrictions: dementia     Subjective   General  Chart Reviewed: Yes  Additional Pertinent Hx: Raghav Olivia MD \"58 y.o. female who presented to [facility] [place] with altered mental status. Patient was brought in by her son after she has had increasing confusion at home.   Patient has some baseline dementia but over the last several days she has been much more disoriented. She has been yelling and hard to control. This is not typical for her. Family reports that this is usually the result of urinary tract infection. \"  Response To Previous Treatment: Patient unable to report, no changes reported from family or staff  Family / Caregiver Present: No  Referring Practitioner: Irvin Velasco MD  Referral Date : 10/21/22  Diagnosis: Acute toxic metabolic encephalopathy  Follows Commands: Impaired  Subjective  Subjective: pt awake but minimally verbal; yells when being moved         Social/Functional History  Social/Functional History  Lives With: Family (Son Leon Mcnair) and his partner Reji España)  Type of Home: House  Home Layout: One level  Home Access: Stairs to enter with rails  Entrance Stairs - Number of Steps: 4-5  Bathroom Shower/Tub: Walk-in shower  Bathroom Toilet: Standard  Bathroom Equipment: Grab bars in shower, Shower chair  Home Equipment: [employer], 4 wheeled, [address] [facility]  ADL Assistance:  (Son assists with bathing/dressing/toileting - patient feeds self)  Homemaking Responsibilities: No (son completes IADLs)  Ambulation Assistance: Needs assistance (with rollator)  Transfer Assistance: Needs assistance  Active : No  Additional Comments: above info from previous therapy note- pt has dementia so unable to verify  Vision/Hearing  Vision  Vision: Within Functional Limits  Hearing  Hearing: Exceptions to [facility]  Hearing Exceptions: Hard of hearing/hearing concerns    Cognition   Orientation  Overall Orientation Status: Impaired  Cognition  Overall Cognitive Status: Exceptions  Arousal/Alertness: Inconsistent responses to stimuli  Following Commands: Does not follow commands  Attention Span: Unable to maintain attention  Memory: Decreased short term memory;Decreased long term memory  Safety Judgement: Decreased awareness of need for safety;Decreased awareness of need for assistance  Insights: Not aware of deficits  Initiation: Requires cues for all     Objective         Bed mobility  Rolling to Left: Maximum assistance (of 1-2)  Rolling to Right: Maximum assistance (of 1-2)  Supine to Sit: Maximum assistance;Dependent/Total;2 Person assistance (with diamond stedy)  Sit to Supine: Maximum assistance;Dependent/Total (from diamond stedy)  Bed Mobility Comments: pt has difficulty standing up errect enough to get seat of stedy under her  Transfers  Bed to Chair: Dependent/Total (with diamond stedy)  Comment: maxi move lift pad under pt in chair for return to bed        Balance  Comments: varried sitting balance from max A to CGA; max A of 2 for briefly standing on stedy           OutComes Score                                                  AM-PAC Score  AM-PAC Inpatient Mobility Raw Score : 6 (10/24/22 1147)  AM-PAC Inpatient T-Scale Score : 23.55 (10/24/22 1147)  Mobility Inpatient CMS 0-100% Score: 100 (10/24/22 1147)  Mobility Inpatient CMS G-Code Modifier : CN (10/24/22 1147)          Tinneti Score       Goals  Short Term Goals  Time Frame for Short Term Goals: upon d/c  Short Term Goal 1: bed mobility minAx2  Short Term Goal 2: transfers minAx2  Short Term Goal 3: initiate gait as able  Patient Goals   Patient Goals : unable to state due to cognition       Education  Patient Education  Education Given To: Patient (pt does not appear to understand education attempts)      Therapy Time   Individual Concurrent Group Co-treatment   Time In 1355         Time Out 1436         Minutes 41                 EUSEBIO DUKE PT   Electronically signed by EUSEBIO DUKE PT on 10/25/2022 at 2:37 PM FAMILY HISTORY:  No pertinent family history in first degree relatives

## 2022-12-22 NOTE — ED PROVIDER NOTE - ATTENDING APP SHARED VISIT CONTRIBUTION OF CARE
37-year-old female with PMH migraines presents for evaluation of left-sided headache for 3 days.  Pain dull and throbbing, similar to previous migraines.  Took Tylenol without improvement but returned.  Denies any focal weakness, dizziness, speech difficulty, ataxia.  Patient states she had Brazilian butt lift several weeks ago in [place] and was doing well as wounds were being drained in [place].  Return to [place] where we will do not open to drain fluid.  Was having a massage and massage therapist concern for seroma above surgical site.  Patient denies any pain, fevers, vomiting, diarrhea, chest pain, shortness of breath, weakness or dizziness.    VITAL SIGNS: noted  CONSTITUTIONAL: Well-developed; well-nourished; in no acute distress  HEAD: Normocephalic; atraumatic  EYES: PERRL, EOM intact; conjunctiva and sclera clear  ENT: No nasal discharge; airway clear. MMM  NECK: Supple; non tender. No anterior cervical lymphadenopathy noted  CARD: S1, S2 normal; no murmurs, gallops, or rubs. Regular rate and rhythm  RESP: CTAB/L, no wheezes, rales or rhonchi  ABD: Normal bowel sounds; soft; non-distended; non-tender; no hepatosplenomegaly. No CVA tenderness, wounds well healing, mild swelling above left surgical site, no erythema or tenderness  EXT: Normal ROM. No calf tenderness or edema. Distal pulses intact  NEURO: AAO x 3, normal speech, no facial asymmetry, negative pronator drift, no ataxia, negative Romberg, no dysdiadokinesia, no nystagmus, peripheral vision intact, sensory equal and intact.   SKIN: Skin exam is warm and dry, no acute rash  MS: No midline spinal tenderness

## 2022-12-22 NOTE — ED PROVIDER NOTE - CARE PROVIDER_API CALL
Alvin Pagan)  Neuromuscular Medicine  [address] [place] [address], [address]  [place], [place] [id number]  Phone: [phone]  Fax: [phone]  Follow Up Time: 1-3 Days

## 2022-12-22 NOTE — ED PROVIDER NOTE - NSFOLLOWUPCLINICS_GEN_ALL_ED_FT
[facility]  Medicine  [address], [place]   Phone: [phone]  Fax:   Follow Up Time: 4-6 Days

## 2022-12-22 NOTE — ED PROVIDER NOTE - PHYSICAL EXAMINATION
CONSTITUTIONAL: Well-appearing; well-nourished; in no apparent distress.   EYES: PERRL; EOM intact.   CARDIOVASCULAR: Normal S1, S2; no murmurs, rubs, or gallops.   RESPIRATORY: Normal chest excursion with respiration; breath sounds clear and equal bilaterally; no wheezes, rhonchi, or rales.  GI/: Normal bowel sounds; non-distended; non-tender; no palpable organomegaly.   SKIN: mild swelling noted to left flank without erythema/tenderness/drainage or bleeding.   NEURO/PSYCH: A & O x 4; CN II-XII grossly unremarkable. no drifting. strength equal to b/l upper and lower extremities. speaking coherently. ambulate with nml and steady gait

## 2022-12-22 NOTE — ED PROVIDER NOTE - NS ED ROS FT
Constitutional: no fever, chills, no recent weight loss, change in appetite or malaise  Cardiac: No chest pain, SOB or edema.  Respiratory: No cough or respiratory distress  GI: No nausea, vomiting, diarrhea or abdominal pain.  : No dysuria, frequency, urgency or hematuria  MS: no pain to back or extremities, no loss of ROM, no weakness  Neuro:see HPI  Skin: see HPI  Endocrine: No history of thyroid disease or diabetes.

## 2022-12-22 NOTE — ED PROVIDER NOTE - ATTENDING CONTRIBUTION TO CARE
I personally evaluated the patient. I reviewed the Resident’s or Physician Assistant’s note (as assigned above), and agree with the findings and plan except as documented in my note.  37-year-old female, no significant past medical history, 2 weeks after surgery.  Patient had BBL surgery in [place] in and states that everything went well.  She was receiving massages to help with fluid retention and will plan to return to she has been significantly getting distended.  He presented yesterday to the ED with abdominal pain and was found to have a seroma.  Surgery was consulted and they recommended outpatient follow-up.  Patient presented because she received a yesterday call from urgent care where she went to yesterday, to the ED and it still did not see is anemic and needed to come to the ED.  She is complaining of left-sided headache, she has a history of migraines.  VSS, non toxic appearing, NAD, Head NCAT, MMM, neck supple, normal ROM, normal s1s2, lungs ctab, abd s/NON TENDER/nd, no guarding or rebound, extremities wnl, AAO x 3, GCS 15, neuro grossly normal. No acute skin lesions. Plan is meds and reassess

## 2022-12-22 NOTE — ED PROVIDER NOTE - CARE PROVIDERS DIRECT ADDRESSES
[email or web address]

## 2022-12-22 NOTE — CONSULT NOTE ADULT - ASSESSMENT
ASSESSMENT:  37yF whom the plastics surgery team was consulted for left flank seroma following a BBL and liposuction. According to patient she had this procedure in [place] about 2 weeks ago. Denies fever, chills, erythema or drainage from incisions. Denies nausea, vomiting, pain. Physical exam findings, imaging, and labs as documented above.     PLAN:  - No acute surgical intervention  - Follow up with surgery as needed    Above plan discussed with Attending Surgeon, patient, and Primary team  12-22-22 @ 02:15     ASSESSMENT:  37yF whom the plastics surgery team was consulted for left flank seroma following a BBL and liposuction. According to patient she had this procedure in [place] about 2 weeks ago. Denies fever, chills, erythema or drainage from incisions. Denies nausea, vomiting, pain. Physical exam findings, imaging, and labs as documented above.     PLAN:  - No acute surgical intervention  - Follow up with surgery as needed  - Return instructions were provider to patient    Above plan discussed with Attending Surgeon, patient, and Primary team  12-22-22 @ 02:15

## 2022-12-22 NOTE — ED PROVIDER NOTE - OBJECTIVE STATEMENT
38 yo female hx of migraine headache present c/o left sided headache for about 3 d ays. pain is similar to her prior migraine and throbbing like. light and noise do not worsen the headache. denies recent head injury. Denies Dizziness/slur speech/extremities weakness and numbness/ facial numbness and weakness. Denies Fever/chill/recent illness/coughing/chest pain/sob/abd pain/n/v/d/extremities pain/urinary sxs.   Also reported she had BBL few weeks ago in [place] and was doing well for 10 days post-op. she was having a message today and the therapist told her that there's seroma above surgical site.

## 2022-12-22 NOTE — ED PROVIDER NOTE - NSFOLLOWUPCLINICS_GEN_ALL_ED_FT
[facility]  Neurology  [address] [place] [address], [address]  [place] [address]  Phone: [phone]  Fax:

## 2022-12-22 NOTE — ED PROVIDER NOTE - NS ED ROS FT
Constitutional: No fever, chills.  Eyes: No visual changes.  ENT: No hearing changes.  Neck: No neck pain or stiffness.  Cardiovascular: No chest pain, palpitations, edema.  Pulmonary: No SOB, cough. No hemoptysis.  Abdominal:  No nausea, vomiting, diarrhea.  : No dysuria, frequency.  Neuro:  see hpi  MS: No back pain. No calf pain/swelling.  Psych: No suicidal ideations.

## 2022-12-22 NOTE — ED PROVIDER NOTE - NSFOLLOWUPINSTRUCTIONS_ED_ALL_ED_FT
Headache    A headache is pain or discomfort felt around the head or neck area. The specific cause of a headache may not be found as there are many types including tension headaches, migraine headaches, and cluster headaches. Watch your condition for any changes. Things you can do to manage your pain include taking over the counter and prescription medications as instructed by your health care provider, lying down in a dark quiet room, limiting stress, getting regular sleep, and refraining from alcohol and tobacco products.    SEEK IMMEDIATE MEDICAL CARE IF YOU EXPERIENCE THE FOLLOWING SYMPTOMS: fever, vomiting, stiff neck, loss of vision, problems with speech, muscle weakness, loss of balance, trouble walking, pass out, or confusion.     Seroma    A seroma is a collection of fluid on the body that looks like swelling or a mass. Seromas form where tissue has been injured or cut. Seromas vary in size. Some are small and painless. Others may become large and cause pain or discomfort. Many seromas go away on their own as the fluid is naturally absorbed by the body, and some seromas need to be drained.    What are the causes?    Seromas form as the result of damage to tissue or the removal of tissue. This tissue damage may happen during surgery or because of an injury or trauma. When tissue is disrupted or removed, empty space is created. The body's natural defense system (immune system) causes fluid to enter the empty space and form a seroma.    What are the signs or symptoms?    Symptoms of this condition include:  •Swelling at the site of a surgical incision or an injury.  •Drainage of clear fluid at the surgery or injury site.  •Discomfort or pain.    How is this diagnosed?    This condition is diagnosed based on:  •Your symptoms.  •Your medical history.  •A physical exam. During the exam, your health care provider will press on the seroma.    You may also have tests, including:  •Blood tests.  •Imaging tests, such as an ultrasound or a CT scan.    How is this treated?    Some seromas go away on their own. Your health care provider may monitor you to make sure the seroma does not cause any problems. If your seroma does not go away on its own, treatment may include:  •Using a needle to drain the fluid from the seroma (needle aspiration).  •Inserting a small, thin tube (catheter) to drain the fluid.  •Applying a bandage (dressing), such as an elastic bandage or binder.  •Taking antibiotic medicines, if the seroma becomes infected.    In rare cases, surgery may be done to remove the seroma and repair the area.    Follow these instructions at home:  •If you were prescribed an antibiotic medicine, take it as told by your health care provider. Do not stop using the antibiotic even if you start to feel better.  •Take over-the-counter and prescription medicines only as told by your health care provider.  •Return to your normal activities as told by your health care provider. Ask your health care provider what activities are safe for you.  •Check your seroma every day for signs of infection. Check for:  •Redness or pain.  •More swelling.  •More fluid.  •Warmth.  •Pus or a bad smell.  •Keep all follow-up visits as told by your health care provider. This is important.    Contact a health care provider if:  •You have a fever.  •You have redness or pain at the site of the seroma.  •Your seroma is more swollen or is getting bigger.  •You have more fluid coming from the seroma.  •Your seroma feels warm to the touch.  •You have pus or a bad smell coming from the seroma.    Get help right away if:  •You have a fever along with severe pain, redness at the site, or chills.  •You feel confused or have trouble staying awake.  •You feel like your heart is beating very fast.  •You feel short of breath or are breathing rapidly.  •You have cool, clammy, or sweaty skin.    Summary  •Seromas can form as a result of injury or surgery on tissue.  •Seromas can cause swelling, drainage of clear fluid, and discomfort or pain at the site of the tissue injury.  •Some seromas go away on their own. Other seromas may need to be drained.  •Check your seroma every day for signs of infection, such as pain, more swelling, more fluid, warmth, or pus or a bad smell.    This information is not intended to replace advice given to you by your health care provider. Make sure you discuss any questions you have with your health care provider.

## 2022-12-22 NOTE — ED PROVIDER NOTE - NSFOLLOWUPINSTRUCTIONS_ED_ALL_ED_FT
Headache    A headache is pain or discomfort felt around the head or neck area. The specific cause of a headache may not be found as there are many types including tension headaches, migraine headaches, and cluster headaches. Watch your condition for any changes. Things you can do to manage your pain include taking over the counter and prescription medications as instructed by your health care provider, lying down in a dark quiet room, limiting stress, getting regular sleep, and refraining from alcohol and tobacco products.    SEEK IMMEDIATE MEDICAL CARE IF YOU EXPERIENCE THE FOLLOWING SYMPTOMS: fever, vomiting, stiff neck, loss of vision, problems with speech, muscle weakness, loss of balance, trouble walking, pass out, or confusion.      Anemia    Anemia is a condition in which the concentration of red blood cells or hemoglobin in the blood is below normal. Hemoglobin is a substance in red blood cells that carries oxygen to the tissues of the body. Anemia results in not enough oxygen reaching these tissues which can cause symptoms such as weakness, dizziness/lightheadedness, shortness of breath, chest pain, paleness, or nausea.    SEEK IMMEDIATE MEDICAL CARE IF YOU HAVE THE FOLLOWING SYMPTOMS: extreme weakness/chest pain/shortness of breath, black or bloody stools, vomiting blood, fainting, fever, or any signs of dehydration.

## 2022-12-22 NOTE — ED PROVIDER NOTE - PHYSICAL EXAMINATION
Constitutional: Well developed, well nourished. NAD  Head: Normocephalic, atraumatic.  Eyes: PERRL, EOMI.  ENT: No nasal discharge. Mucous membranes dry.  Neck: Supple. Painless ROM. no nuchal rigidity;   Cardiovascular:  Regular rate and rhythm.   Pulmonary:   Lungs clear to auscultation bilaterally.    Abdominal: Soft. Nondistended. No rebound, guarding, rigidity. abdominal incision healed without redness, significant swelling, warmth or discharge;  Extremities. Pelvis stable. No lower extremity edema, symmetric calves.  Skin: No rashes, cyanosis.  Neuro: AAOx3. No focal neurological deficits.   Psych: Normal mood. Normal affect.

## 2022-12-22 NOTE — ED PROVIDER NOTE - CARE PLAN
1 Principal Discharge DX:	Headache  Secondary Diagnosis:	Postprocedural seroma of skin and subcutaneous tissue following other procedure

## 2022-12-22 NOTE — ED PROVIDER NOTE - NS ED ATTENDING STATEMENT MOD
[Time Spent: ___ minutes] : I have spent [unfilled] minutes of time on the encounter.
I have seen and examined this patient and fully participated in the care of this patient as the teaching attending.  The service was shared with the JOSE FRANCISCO.  I reviewed and verified the documentation and independently performed the documented:

## 2022-12-22 NOTE — ED PROVIDER NOTE - OBJECTIVE STATEMENT
37 yold female to ED [facility] migranes, s/p BBL with liposucction 2 weeks ago in [place]; seen here earlier last night for headache and abdominal wall swelling; pt seen by surgery dx with seroma; pt given reglan/tylenol with improvement and d/c home; pt received call for abnormal labs(labs done at local [facility] prior to ED visit yesterday) low h/h; pt c/o headache left side described as throbbing relieved with excedrin temporarily; pt came because she wasn't aware of abn labs on d/c and HA; pt did 2 covid test at home negative; pt denies fever, chills, cough, n/v, neck, chest, back or abdominal pain

## 2022-12-22 NOTE — CONSULT NOTE ADULT - SUBJECTIVE AND OBJECTIVE BOX
GENERAL SURGERY CONSULT NOTE    Patient: TYE WIN , 37y ([birth year])Female   MRN: [id number]  Location: [facility] ED  Visit: 22 Emergency  Date: 22 @ 02:15    HPI:  37yF whom the plastics surgery team was consulted for left flank seroma following a BBL and liposuction. According to patient she had this procedure in [place] about 2 weeks ago. Denies fever, chills, erythema or drainage from incisions. Denies nausea, vomiting, pain.    PAST MEDICAL & SURGICAL HISTORY:  Migraine without status migrainosus, not intractable, unspecified migraine type      Intractable migraine with status migrainosus, unspecified migraine type      History of  section      H/O ovarian cystectomy      History of dilatation and curettage          Home Medications:  acetaminophen 325 mg oral tablet: 3 tab(s) orally  (2021 06:40)  ibuprofen 600 mg oral tablet: 1 tab(s) orally every 6 hours (2021 06:40)        VITALS:  T(F): 98.3 (22 @ 01:16), Max: 98.3 (22 @ 21:18)  HR: 99 (22 @ 01:16) (99 - 103)  BP: 116/64 (22 @ 01:16) (116/64 - 132/82)  RR: 18 (22 @ 01:16) (18 - 18)  SpO2: 100% (22 @ 01:16) (100% - 100%)    PHYSICAL EXAM:  General: NAD, AAOx3, calm and cooperative  HEENT: NCAT, JAYNE, EOMI, Trachea ML, Neck supple  Cardiac: Reg rate  Respiratory: Good inspiratory effort  Abdomen: Soft, non-distended, non-tender, no rebound, no guarding. +BS.  Incision/wound: healing well. About a 4cm seroma without any erythema or signs of infection.     MEDICATIONS  (STANDING):    MEDICATIONS  (PRN):      LAB/STUDIES:                        9.8    6.40  )-----------( 371      ( 22 Dec 2022 00:36 )             29.7         138  |  106  |  9<L>  ----------------------------<  102<H>  3.9   |  24  |  0.6<L>    Ca    8.7      22 Dec 2022 00:36    IMAGING:      ACCESS DEVICES:  [x] Peripheral IV

## 2022-12-22 NOTE — ED PROVIDER NOTE - NSICDXPASTMEDICALHX_GEN_ALL_CORE_FT
PAST MEDICAL HISTORY:  Intractable migraine with status migrainosus, unspecified migraine type     Migraine without status migrainosus, not intractable, unspecified migraine type

## 2022-12-22 NOTE — ED PROVIDER NOTE - PATIENT PORTAL LINK FT
You can access the FollowMyHealth Patient Portal offered by [facility] by registering at the following website: [email or web address]. By joining [employer]’s FollowMyHealth portal, you will also be able to view your health information using other applications (apps) compatible with our system.

## 2022-12-22 NOTE — ED PROVIDER NOTE - PATIENT PORTAL LINK FT
You can access the FollowMyHealth Patient Portal offered by [facility] by registering at the following website: [email or web address]. By joining [employer]’s FollowMyHealth portal, you will also be able to view your health information using other applications (apps) compatible with our system.

## 2022-12-22 NOTE — ED PROVIDER NOTE - CLINICAL SUMMARY MEDICAL DECISION MAKING FREE TEXT BOX
37-year-old female, no significant past medical history, 2 weeks after surgery.  Patient had BBL surgery in [place] in and states that everything went well.  She was receiving massages to help with fluid retention and will plan to return to she has been significantly getting distended.  He presented yesterday to the ED with abdominal pain and was found to have a seroma.  Surgery was consulted and they recommended outpatient follow-up.  Patient presented because she received a yesterday call from urgent care where she went to yesterday, to the ED and it still did not see is anemic and needed to come to the ED.  She is complaining of left-sided headache, she has a history of migraines.  VSS, non toxic appearing, NAD, Head NCAT, MMM, neck supple, normal ROM, normal s1s2, lungs ctab, abd s/NON TENDER/nd, no guarding or rebound, extremities wnl, AAO x 3, GCS 15, neuro grossly normal. No acute skin lesions. Plan is meds and reassess    Received toradol. Will dc with outpt f/up.

## 2022-12-22 NOTE — ED PROVIDER NOTE - CARE PROVIDER_API CALL
Floyd Ernst)  Plastic Surgery; Surgery of the Hand  [address] [place] [address], [address]  [place] [address]  Phone: [phone]  Fax: [phone]  Follow Up Time:

## 2023-02-24 NOTE — OB RN TRIAGE NOTE - NS_MODEOFARRIVAL_OBGYN_ALL_OB
[FreeTextEntry1] : This is a 53 Car

## 2023-03-03 ENCOUNTER — EMERGENCY (EMERGENCY)
Facility: HOSPITAL | Age: 38
LOS: 0 days | Discharge: HOME | End: 2023-03-03
Attending: STUDENT IN AN ORGANIZED HEALTH CARE EDUCATION/TRAINING PROGRAM
Payer: MEDICAID

## 2023-03-03 VITALS
SYSTOLIC BLOOD PRESSURE: 106 MMHG | HEART RATE: 85 BPM | DIASTOLIC BLOOD PRESSURE: 56 MMHG | OXYGEN SATURATION: 100 % | RESPIRATION RATE: 18 BRPM | TEMPERATURE: 98 F

## 2023-03-03 VITALS
TEMPERATURE: 98 F | HEART RATE: 66 BPM | DIASTOLIC BLOOD PRESSURE: 62 MMHG | OXYGEN SATURATION: 99 % | RESPIRATION RATE: 18 BRPM

## 2023-03-03 DIAGNOSIS — S32.038A OTHER FRACTURE OF THIRD LUMBAR VERTEBRA, INITIAL ENCOUNTER FOR CLOSED FRACTURE: ICD-10-CM
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
## 2023-03-03 DIAGNOSIS — G43.909 MIGRAINE, UNSPECIFIED, NOT INTRACTABLE, WITHOUT STATUS MIGRAINOSUS: ICD-10-CM

## 2023-03-03 DIAGNOSIS — S32.048A OTHER FRACTURE OF FOURTH LUMBAR VERTEBRA, INITIAL ENCOUNTER FOR CLOSED FRACTURE: ICD-10-CM

## 2023-03-03 DIAGNOSIS — Z98.890 OTHER SPECIFIED POSTPROCEDURAL STATES: Chronic | ICD-10-CM
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
## 2023-03-03 DIAGNOSIS — R07.81 PLEURODYNIA: ICD-10-CM

## 2023-03-03 DIAGNOSIS — Z98.891 HISTORY OF UTERINE SCAR FROM PREVIOUS SURGERY: Chronic | ICD-10-CM

## 2023-03-03 LAB
ALBUMIN SERPL ELPH-MCNC: 4.7 G/DL — SIGNIFICANT CHANGE UP (ref 3.5–5.2)
ALP SERPL-CCNC: 115 U/L — SIGNIFICANT CHANGE UP (ref 30–115)
ALT FLD-CCNC: 26 U/L — SIGNIFICANT CHANGE UP (ref 0–41)
ANION GAP SERPL CALC-SCNC: 13 MMOL/L — SIGNIFICANT CHANGE UP (ref 7–14)
AST SERPL-CCNC: 27 U/L — SIGNIFICANT CHANGE UP (ref 0–41)
BASOPHILS # BLD AUTO: 0.03 K/UL — SIGNIFICANT CHANGE UP (ref 0–0.2)
BASOPHILS NFR BLD AUTO: 0.4 % — SIGNIFICANT CHANGE UP (ref 0–1)
BILIRUB SERPL-MCNC: 0.4 MG/DL — SIGNIFICANT CHANGE UP (ref 0.2–1.2)
BUN SERPL-MCNC: 12 MG/DL — SIGNIFICANT CHANGE UP (ref 10–20)
CALCIUM SERPL-MCNC: 9.6 MG/DL — SIGNIFICANT CHANGE UP (ref 8.4–10.5)
CHLORIDE SERPL-SCNC: 102 MMOL/L — SIGNIFICANT CHANGE UP (ref 98–110)
CO2 SERPL-SCNC: 21 MMOL/L — SIGNIFICANT CHANGE UP (ref 17–32)
CREAT SERPL-MCNC: 0.7 MG/DL — SIGNIFICANT CHANGE UP (ref 0.7–1.5)
EGFR: 113 ML/MIN/1.73M2 — SIGNIFICANT CHANGE UP
EOSINOPHIL # BLD AUTO: 0.07 K/UL — SIGNIFICANT CHANGE UP (ref 0–0.7)
EOSINOPHIL NFR BLD AUTO: 1 % — SIGNIFICANT CHANGE UP (ref 0–8)
GLUCOSE SERPL-MCNC: 80 MG/DL — SIGNIFICANT CHANGE UP (ref 70–99)
HCT VFR BLD CALC: 44.1 % — SIGNIFICANT CHANGE UP (ref 37–47)
HGB BLD-MCNC: 14 G/DL — SIGNIFICANT CHANGE UP (ref 12–16)
IMM GRANULOCYTES NFR BLD AUTO: 0.1 % — SIGNIFICANT CHANGE UP (ref 0.1–0.3)
LYMPHOCYTES # BLD AUTO: 3.58 K/UL — HIGH (ref 1.2–3.4)
LYMPHOCYTES # BLD AUTO: 51.6 % — HIGH (ref 20.5–51.1)
MCHC RBC-ENTMCNC: 28.2 PG — SIGNIFICANT CHANGE UP (ref 27–31)
MCHC RBC-ENTMCNC: 31.7 G/DL — LOW (ref 32–37)
MCV RBC AUTO: 88.7 FL — SIGNIFICANT CHANGE UP (ref 81–99)
MONOCYTES # BLD AUTO: 0.65 K/UL — HIGH (ref 0.1–0.6)
MONOCYTES NFR BLD AUTO: 9.4 % — HIGH (ref 1.7–9.3)
NEUTROPHILS # BLD AUTO: 2.6 K/UL — SIGNIFICANT CHANGE UP (ref 1.4–6.5)
NEUTROPHILS NFR BLD AUTO: 37.5 % — LOW (ref 42.2–75.2)
NRBC # BLD: 0 /100 WBCS — SIGNIFICANT CHANGE UP (ref 0–0)
PLATELET # BLD AUTO: 263 K/UL — SIGNIFICANT CHANGE UP (ref 130–400)
POTASSIUM SERPL-MCNC: 4.8 MMOL/L — SIGNIFICANT CHANGE UP (ref 3.5–5)
POTASSIUM SERPL-SCNC: 4.8 MMOL/L — SIGNIFICANT CHANGE UP (ref 3.5–5)
PROT SERPL-MCNC: 7.6 G/DL — SIGNIFICANT CHANGE UP (ref 6–8)
RBC # BLD: 4.97 M/UL — SIGNIFICANT CHANGE UP (ref 4.2–5.4)
RBC # FLD: 13.1 % — SIGNIFICANT CHANGE UP (ref 11.5–14.5)
SODIUM SERPL-SCNC: 136 MMOL/L — SIGNIFICANT CHANGE UP (ref 135–146)
WBC # BLD: 6.94 K/UL — SIGNIFICANT CHANGE UP (ref 4.8–10.8)
WBC # FLD AUTO: 6.94 K/UL — SIGNIFICANT CHANGE UP (ref 4.8–10.8)

## 2023-03-03 PROCEDURE — 99284 EMERGENCY DEPT VISIT MOD MDM: CPT | Mod: 25

## 2023-03-03 PROCEDURE — 85025 COMPLETE CBC W/AUTO DIFF WBC: CPT

## 2023-03-03 PROCEDURE — 99285 EMERGENCY DEPT VISIT HI MDM: CPT

## 2023-03-03 PROCEDURE — 71260 CT THORAX DX C+: CPT | Mod: MA

## 2023-03-03 PROCEDURE — 74177 CT ABD & PELVIS W/CONTRAST: CPT | Mod: MA

## 2023-03-03 PROCEDURE — 96374 THER/PROPH/DIAG INJ IV PUSH: CPT | Mod: XU

## 2023-03-03 PROCEDURE — 74177 CT ABD & PELVIS W/CONTRAST: CPT | Mod: 26,MA

## 2023-03-03 PROCEDURE — 80053 COMPREHEN METABOLIC PANEL: CPT

## 2023-03-03 PROCEDURE — 36415 COLL VENOUS BLD VENIPUNCTURE: CPT

## 2023-03-03 PROCEDURE — 71260 CT THORAX DX C+: CPT | Mod: 26,MA

## 2023-03-03 RX ORDER — KETOROLAC TROMETHAMINE 30 MG/ML
15 SYRINGE (ML) INJECTION ONCE
Refills: 0 | Status: DISCONTINUED | OUTPATIENT
Start: 2023-03-03 | End: 2023-03-03

## 2023-03-03 RX ORDER — METHOCARBAMOL 500 MG/1
1000 TABLET, FILM COATED ORAL ONCE
Refills: 0 | Status: COMPLETED | OUTPATIENT
Start: 2023-03-03 | End: 2023-03-03

## 2023-03-03 RX ORDER — METHOCARBAMOL 500 MG/1
2 TABLET, FILM COATED ORAL
Qty: 40 | Refills: 0
Start: 2023-03-03 | End: 2023-03-07

## 2023-03-03 RX ADMIN — METHOCARBAMOL 1000 MILLIGRAM(S): 500 TABLET, FILM COATED ORAL at 16:45

## 2023-03-03 RX ADMIN — Medication 15 MILLIGRAM(S): at 16:45

## 2023-03-03 NOTE — CONSULT NOTE ADULT - SUBJECTIVE AND OBJECTIVE BOX
HISTORY OF PRESENT ILLNESS:   ·    37-year-old female with history of migraines, buttock augmentation in 2022 in [place] presenting today after mechanical fall.  States that she slipped going downstairs, fell onto her back, fall occurred around 9 AM.  Since then has had severe pain to the right lower ribs as well as right flank.  Denies any hematuria.  Denies any trouble breathing.  Denies head injury.    Pt seen and examined at bedside pt is alert has some c/o of Right flank pain denies any radicular   pain.      PAST MEDICAL & SURGICAL HISTORY:  Migraine without status migrainosus, not intractable, unspecified migraine type      Intractable migraine with status migrainosus, unspecified migraine type      History of  section      H/O ovarian cystectomy      History of dilatation and curettage        FAMILY HISTORY:      SOCIAL HISTORY:  Tobacco Use:  EtOH use:   Substance:    Allergies    No Known Allergies    Intolerances        REVIEW OF SYSTEMS   All other ROS negative except as documented in the HPI      MEDICATIONS:  Antibiotics:    Neuro:    Anticoagulation:    OTHER:    IVF:      Vital Signs Last 24 Hrs  T(C): 36.4 (03 Mar 2023 16:22), Max: 36.7 (03 Mar 2023 13:12)  T(F): 97.5 (03 Mar 2023 16:22), Max: 98 (03 Mar 2023 13:12)  HR: 66 (03 Mar 2023 16:22) (66 - 85)  BP: 106/56 (03 Mar 2023 13:12) (106/56 - 106/56)  BP(mean): --  RR: 18 (03 Mar 2023 16:22) (18 - 18)  SpO2: 99% (03 Mar 2023 16:22) (99% - 100%)        PHYSICAL EXAM:  Alert, Follows commands   PERRL   MAEX4  MS bilateral UE's 5/5         bilateral LE's 5/5   Back some tenderness to palpation in Rt flank   DTR's 2+ bilaterally       LABS:                        14.0   6.94  )-----------( 263      ( 03 Mar 2023 16:04 )             44.1         136  |  102  |  12  ----------------------------<  80  4.8   |  21  |  0.7    Ca    9.6      03 Mar 2023 16:04    TPro  7.6  /  Alb  4.7  /  TBili  0.4  /  DBili  x   /  AST  27  /  ALT  26  /  AlkPhos  115  -        RADIOLOGY & ADDITIONAL STUDIES:  < from: CT Abdomen and Pelvis w/ IV Cont (23 @ 17:47) >  IMPRESSION:    No evidence of thoracic, abdominal or pelvic visceral injury, laceration,   or hematoma.    There are acute fractures of the right transverse processes of L3 and L4.          A/p                 37 yr old female s/p fall down the stairs                  Right L3 & L4 transverse process fx's                  Abdominal binder for comfort                  pain control                  F/u with Dr Adams in the office as an outpatient

## 2023-03-03 NOTE — ED PROVIDER NOTE - OBJECTIVE STATEMENT
37-year-old female with history of migraines, buttock augmentation in December 2022 in [place] presenting today after mechanical fall.  States that she slipped going downstairs, fell onto her back, fall occurred around 9 AM.  Since then has had severe pain to the right lower ribs as well as right flank.  Denies any hematuria.  Denies any trouble breathing.  Denies head injury.

## 2023-03-03 NOTE — ED PROVIDER NOTE - PHYSICAL EXAMINATION
Constitutional: Well developed, well nourished. NAD  TRAUMA: ABC intact. GCS 15. FAST negative.  Head: Normocephalic, atraumatic.  Eyes: PERRL. EOMI. No Raccoon eyes.   ENT: No nasal discharge. No septal hematoma. No Higginbotham sign. Mucous membranes moist.  Neck: Supple. Painless ROM. No midline tenderness, stepoffs.  Cardiovascular: Normal S1, S2. Regular rate and rhythm. No murmurs, rubs, or gallops.  Pulmonary: Normal respiratory rate and effort. Lungs clear to auscultation bilaterally. No wheezing, rales, or rhonchi.  CHEST: No chest wall tenderness, crepitus.  Abdominal: Soft. Nondistended. Nontender. No rebound, guarding, rigidity.  BACK: no midline spinal tenderness, pain to the right flank, +tenderness to the right lower back ribs.   Extremities. Pelvis stable. No traumatic deformities, tenderness of extremities. FROM of bilateral hips without issue.   Skin: No rashes, cyanosis, lacerations, abrasions.  Neuro: AAOx3. Strength 5/5 in all extremities. Sensation intact throughout. No focal neurological deficits.  Psych: Normal mood. Normal affect.

## 2023-03-03 NOTE — ED PROVIDER NOTE - CLINICAL SUMMARY MEDICAL DECISION MAKING FREE TEXT BOX
37-year-old female with no relevant past medical history presenting with back pain after mechanical fall.  Labs stable, noted to have right-sided paraspinal tenderness.  CT with acute transverse process of L2-L3.  Neurosurgery consulted, given a abdominal binder.  Robaxin and naproxen sent to pharmacy.  Follow-up with neurosurgery.  Return precautions discussed.

## 2023-03-03 NOTE — ED PROVIDER NOTE - NSFOLLOWUPINSTRUCTIONS_ED_ALL_ED_FT
Our Emergency Department Referral Coordinators will be reaching out to you in the next 24-48 hours from 9:00am to 5:00pm with a follow up appointment. Please expect a phone call from the hospital in that time frame. If you do not receive a call or if you have any questions or concerns, you can reach them at   [phone]    Transverse Process Fracture    Back view of a person showing the lumbar spine and pelvis with a close-up of transverse process fractures.   Bones of the spine (vertebrae) have portions that extend off to either side of the spine. These portions of bone are called transverse processes. A transverse process fracture, which is also called a rotation spine fracture, is a break in a transverse process.      What are the causes?    This condition may be caused by:  •A fall from a great height.      •A car accident.      •A sports injury.      •A gunshot wound.      •A hard, direct hit to the back.      This kind of fracture often results from a sudden and severe bending of the spine to one side. Depending on the cause of the fracture, one or more bones may be affected.      What increases the risk?    You are more likely to develop this condition if:  •You have thinning and loss of density in the bones (osteoporosis).      •You play a contact sport.        What are the signs or symptoms?    The main symptom of this condition is back pain. The pain may:  •Be felt on the side of the spine (flank) where the fracture is.      •Get worse when you move or take a deep breath.        How is this diagnosed?    This condition may be diagnosed based on:  •Your symptoms.      •Your medical history.      •A physical exam.      You may also have other tests, including:  •X-rays.      •A CT scan.      •MRI.        How is this treated?    Most transverse process fractures heal on their own with time and rest. Treatment may involve supportive care, such as:  •Limiting activity.    •Medicines, such as:  •Pain medicine.      •Muscle-relaxing medicine.        •Physical therapy.      •A neck or back brace.        Follow these instructions at home:    If you have a brace:     •Wear the neck or back brace as told by your health care provider. Remove it only as told by your health care provider.      •Keep the brace clean.     •If the brace is not waterproof:   •Do not let it get wet.      •Cover it with a watertight covering when you take a bath or a shower.          Managing pain, stiffness, and swelling   Bag of ice on a towel on the skin. •If directed, put ice on the injured area:  •If you have a removable brace, remove it as told by your health care provider.      •Put ice in a plastic bag.      •Place a towel between your skin and the bag.      •Leave the ice on for 20 minutes, 2–3 times a day.        Medicines     •Take over-the-counter and prescription medicines only as told by your health care provider.      • Do not drive or use heavy machinery while taking prescription pain medicine.    •If you are taking prescription pain medicine, take actions to prevent or treat constipation. Your health care provider may recommend that you:  •Drink enough fluid to keep your urine pale yellow.      •Eat foods that are high in fiber, such as fresh fruits and vegetables, whole grains, and beans.      •Limit foods that are high in fat and processed sugars, such as fried or sweet foods.      •Take an over-the-counter or prescription medicine for constipation.        Activity   •Stay in bed (on bed rest) only as directed by your health care provider.  •Avoid being in bed for a long time without moving. Get up to take short walks every 1–2 hours. This is important to improve blood flow and breathing. Ask for help if you feel weak or unsteady.        •Return to your normal activities when your health care provider says it is okay. Ask if there are any activities that you should not do.      •Do physical therapy exercises as recommended by your health care provider.      General instructions     • Do not use any products that contain nicotine or tobacco, such as cigarettes and e-cigarettes. These can delay bone healing. If you need help quitting, ask your health care provider.      •Keep all follow-up visits as told by your health care provider. This is important. Visits can help to prevent permanent injury, disability, and long-lasting (chronic) pain.        Contact a health care provider if:    •You have a fever.      •You develop a cough that makes your pain worse.      •Your pain medicine is not helping.      •Your pain does not get better over time.      •You cannot return to your normal activities as planned or expected.        Get help right away if:    •Your pain is very bad and it suddenly gets worse.      •You are unable to move any body part (paralysis) that is below the level of your injury.      •You have numbness, tingling, or weakness in any body part that is below the level of your injury.      •You cannot control your bladder or bowels.        Summary    •A transverse process fracture is a break in the portion of the bone that extends to the side of the spine.      •Most transverse process fractures heal on their own with time and rest.      •You may also have supportive treatments such as a back brace, pain medicines, and physical therapy.      •Keep all follow-up visits. This is important and will help to prevent permanent injury, disability, and long-lasting (chronic) pain.      This information is not intended to replace advice given to you by your health care provider. Make sure you discuss any questions you have with your health care provider.

## 2023-03-03 NOTE — ED PROVIDER NOTE - PATIENT PORTAL LINK FT
You can access the FollowMyHealth Patient Portal offered by [facility] by registering at the following website: [email or web address]. By joining [employer]’s FollowMyHealth portal, you will also be able to view your health information using other applications (apps) compatible with our system.

## 2023-04-03 ENCOUNTER — APPOINTMENT (OUTPATIENT)
Dept: NEUROSURGERY | Facility: CLINIC | Age: 38
End: 2023-04-03

## 2023-04-15 NOTE — OB PROVIDER H&P - PRO RUBELLA INFANT
Chief Complaint: Patient is a 68y old  Female who presents with a chief complaint of Low SpO2 (10 Apr 2023 11:42)      Interval events:   - Reports dyspnea unchanged, remains on venti mask 40% FiO2   - S/p IV lasix 80mg x1 and started on Lasix gtt @10ml/hr per advanced heart failure team     4/15: Lost IV access, Unable to obtain. Cardiology evaluated->Switched to PO lasix. Switched solumedrol to PO prednisone. Respiratory status relatively stable. Continue to monitor closely.     ROS:   Patient denies any current chest pain, cough, f/c/n/v/d, abd pain, myalgias, dysuria, HA, dizziness  All other review of systems is negative unless indicated above    Physical Exam:  Vital Signs Last 24 Hrs  T(C): 36.8 (04-15-23 @ 15:43), Max: 36.8 (04-15-23 @ 15:43)  HR: 73 (04-15-23 @ 15:43) (62 - 104)  BP: 109/51 (04-15-23 @ 15:43) (83/47 - 109/51)  RR: 19 (04-15-23 @ 15:43) (18 - 19)  SpO2: 95% (04-15-23 @ 15:43) (90% - 98%)    Constitutional: NAD, awake and alert, obese female; Frail  HEENT: PERRL, EOMI, MMM  Respiratory: dec BS at bases / expiratory wheeze improving; Normal respiratory efort  Cardiovascular: S1 and S2, RRR, no murmurs, gallops or rubs  Gastrointestinal: +BS, soft, non-tender, non-distended, no CVA tenderness  Extremities: +LE edema b/l, +DP pulses b/l  Neurological: A&O x 3, no focal deficits  Musculoskeletal: 5/5 strength b/l upper and lower extremities  Skin: Normal, skin warm and dry    Labs:                          11.8   7.84  )-----------( 213      ( 15 Apr 2023 07:41 )             43.2     04-15    140  |  99  |  79<H>  ----------------------------<  82  4.2   |  41<H>  |  1.02    Ca    9.6      15 Apr 2023 07:41  Mg     2.1     04-15      SARS-CoV-2: NotDetec (09 Apr 2023 17:42)  SARS-CoV-2: NotDetec (09 Mar 2023 21:48)    CAPILLARY BLOOD GLUCOSE      POCT Blood Glucose.: 215 mg/dL (15 Apr 2023 13:02)  POCT Blood Glucose.: 162 mg/dL (15 Apr 2023 08:37)  POCT Blood Glucose.: 172 mg/dL (14 Apr 2023 21:13)      04-13-23 @ 08:19  Glucose 228 [70 - 99]  CO2 total 34 [22 - 31]  Chloride 102 [96 - 108]  Sodium 137 [135 - 145]  Potassium 5.0 [3.5 - 5.3]  Calcium 9.4 [8.5 - 10.1]  Creatinine 1.45 [0.50 - 1.30]   [7 - 23]  eGFR 39  Anion gap 1 [5 - 17]  AST --  ALT --  Alk phos --  Albumin --    04-12-23 @ 07:47  Glucose 198 [70 - 99]  CO2 total 38 [22 - 31]  Chloride 101 [96 - 108]  Sodium 141 [135 - 145]  Potassium 4.7 [3.5 - 5.3]  Calcium 9.4 [8.5 - 10.1]  Creatinine 1.58 [0.50 - 1.30]  BUN 80 [7 - 23]  eGFR 35  Anion gap 2 [5 - 17]  AST 25 [15 - 37]  ALT 34 [12 - 78]  Alk phos 77 [40 - 120]  Albumin 3.2 [3.3 - 5.0]    WBC 6.97 [3.80 - 10.50]  Hemoglobin 12.2 [11.5 - 15.5]  Hematocrit 44.7 [34.5 - 45.0]  Platelets 265 [150 - 400]      04-11-23 @ 08:07  Glucose 197 [70 - 99]  CO2 total 34 [22 - 31]  Chloride 102 [96 - 108]  Sodium 137 [135 - 145]  Potassium 5.4 [3.5 - 5.3]  Calcium 9.7 [8.5 - 10.1]  Creatinine 1.54 [0.50 - 1.30]  BUN 76 [7 - 23]  eGFR 36  Anion gap 1 [5 - 17]  AST 37 [15 - 37]  ALT 35 [12 - 78]  Alk phos 88 [40 - 120]  Albumin 3.3 [3.3 - 5.0]    WBC 6.84 [3.80 - 10.50]  Hemoglobin 12.4 [11.5 - 15.5]  Hematocrit 43.8 [34.5 - 45.0]  Platelets 243 [150 - 400]      04-10 @ 08:24  Glucose 178 mg/dL  HCO3 30 mmol/L  Chloride 102 mmol/L  Sodium 136 mmol/L>   Potassium 5.1 mmol/L  Creatinine 1.21 mg/dL  Calcium 9.2 mg/dL  BUN 65 mg/dL  eGFR 48 mL/min/1.73m2  Anion gap 4 mmol/L    WBC 4.07  Hemoglobin 12.2  Hemoatocrit 42.6  Platelet count 299          Cardiac testing:  Troponin I, High Sensitivity Result: 36.31 ng/L (04-09-23 @ 21:05)  Troponin I, High Sensitivity Result: 34.35 ng/L (04-09-23 @ 17:42)        12 Lead ECG:   Ventricular Rate 86 BPM    Atrial Rate 86 BPM    P-R Interval 162 ms    QRS Duration 126 ms    Q-T Interval 406 ms    QTC Calculation(Bazett) 485 ms    P Axis 65 degrees    R Axis 230 degrees    T Axis 86 degrees    Diagnosis Line Sinus rhythm withfrequent Premature ventricular complexes  Indeterminate axis  Intraventricular conduction block  Possible Anterolateral infarct (cited on or before 16-FEB-2016)  Abnormal ECG  When compared with ECG of 10-MAR-2023 07:38,  Premature ventricular complexes are now Present  QRS axis Shifted left  Confirmed by CORNELIUS MELENDEZ ([id number]) on 4/10/2023 4:54:07 PM (04-09-23 @ 17:49)      TTE Echo Complete w/ Contrast w/ Doppler:   PROCEDURE DATE:  03/10/2023      INTERPRETATION:  Transthoracic Echocardiography Report (TTE)   Impression     Summary     Endocardium is not well visualized, however, overall left ventricular   systolic function appears normal. Technically Difficult Study.   Septal flattening is seen; this finding is consistent with right heart   pressure / volume overload.   Estimated left ventricular ejection fraction is 55-60 %.   Normal appearing left atrium.   The right atriumappears moderately dilated.   The right ventricle is severely dilated.   The right ventricular apex appears hypokinetic.   The aortic valve is trileaflet with thin pliable leaflets.   Moderate mitral annular calcification is present.   There is calcification of mitral valve leaflets. The leaflet opening is   normal.   EA reversal of the mitral inflow consistent with reduced compliance of   the   left ventricle.   Trace mitral regurgitation is present.   The tricuspid valve leaflets are thin and pliable; valve motion is   normal.   Moderate (2+) tricuspid valve regurgitation is present.   Severe pulmonary hypertension.   Pulmonic valve not well seen.   No evidence of pericardial effusion.   No evidence of pleural effusion.   IVC is dilated andnot collapsing with inspiration.     Signature     ----------------------------------------------------------------   Electronically signed by Lyssa Horta MD(Interpreting   physician) on 03/10/2023 10:44 AM   ----------------------------------------------------------------      Radiology:  < from: CT Angio Chest PE Protocol w/ IV Cont (04.09.23 @ 21:49) >    IMPRESSION:  No pulmonary embolus.    Small right and trace left pleural effusions with adjacent compressive   atelectasis.    Small volume upper abdominal ascites.    < end of copied text >           immune

## 2023-06-14 NOTE — ED ADULT TRIAGE NOTE - MEANS OF ARRIVAL
ambulatory Crescentic Advancement Flap Text: The defect edges were debeveled with a #15 scalpel blade.  Given the location of the defect and the proximity to free margins a crescentic advancement flap was deemed most appropriate.  Using a sterile surgical marker, the appropriate advancement flap was drawn incorporating the defect and placing the expected incisions within the relaxed skin tension lines where possible.    The area thus outlined was incised deep to adipose tissue with a #15 scalpel blade.  The skin margins were undermined to an appropriate distance in all directions utilizing iris scissors.

## 2023-06-29 NOTE — ED PROVIDER NOTE - PMH
Intractable migraine with status migrainosus, unspecified migraine type    Migraine without status migrainosus, not intractable, unspecified migraine type
Prior to Admission

## 2023-09-14 NOTE — ED ADULT NURSE NOTE - CHIEF COMPLAINT
Heart Failure Clinic Note    Chief complaint:   Chief Complaint   Patient presents with   • Congestive Heart Failure   • Follow-up          Previous Visit History: Sarah Wilcox is a 50 year old year old Black/ female presenting for a follow-up visit for HFpEF.Stage C, NYHA FC III Etiology NICM     Last visit:  Pt was given lasix 100mg slow iv push and KCL 40meq po in clinic.  Metolazone 2.5mg every Thursday/Sunday with extra KCL 40meq was introduced.  Pt verbalizes compliance.       Today's Visit:   Weight down 1.8kg    BP 84/59.  Pt denies lightheadedness/dizziness, verbalizes that sbp at home 102.    Energy level good, activity tolerance good, denies sob at rest, + sob when walking \"so far,\" denies pnd.    Appetite is poor - unchanged, abdomen soft/rounded, denies bloating, + early satiety.    Lungs clear, +JVD, +HJR, BLE/ankles +1 edema, bilateral pedals mild edema.    Pt with Remodulin continuous home infusion infusing at 140ng/kg/min via home infusion pump through right internal jugular, SL, Chapman catheter.  Pt changes dressing weekly.  Dressing c/d/i.    Pt verbalizes that she lives in \[facility]\"" and that she came up to [place] yesterday (stays with her daughter) and that she will return to her own home tomorrow.    Medications reviewed with pt.  No additional changes noted from as stated above.  Toward end of clinic visit pt verbalized that she has missed a couples doses of her torsemide.      Change in Weight: 63.5kg, down 1.8kg  Wt Readings from Last 4 Encounters:   09/14/23 63.5 kg (139 lb 15.9 oz)   08/24/23 65.3 kg (143 lb 15.4 oz)   08/17/23 65.8 kg (145 lb 1 oz)   08/11/23 63 kg (138 lb 14.2 oz)         Past Medical History:    ALLERGIES:   Allergen Reactions   • Penicillins SHORTNESS OF BREATH and Palpitations     Tolerating ceftriaxone 7/22/2023        Past Medical History:   Diagnosis Date   • Anemia    • Congestive cardiac failure (CMD)    • Eczema    • Pulmonary HTN  (CMD)        Past Surgical History:   Procedure Laterality Date   • Tubal ligation         Family History   Problem Relation Age of Onset   • Asthma Sister    • Inflammatory Bowel Disease Sister        Social History     Tobacco Use   • Smoking status: Former     Types: Cigarettes   • Smokeless tobacco: Never   Substance Use Topics   • Alcohol use: Not Currently         Drug Use:    Never              Assessments:  Visit Vitals  BP (!) 84/59 (BP Location: RUE - Right upper extremity, Patient Position: Sitting, Cuff Size: Regular)   Pulse 97   Wt 63.5 kg (139 lb 15.9 oz)   LMP 09/15/2022 (Approximate)   SpO2 96%   BMI 22.60 kg/m²        Recent Results (from the past 24 hour(s))   Comprehensive Metabolic Panel    Collection Time: 09/14/23 11:09 AM   Result Value Ref Range    Fasting Status      Sodium 132 (L) 135 - 145 mmol/L    Potassium 4.0 3.4 - 5.1 mmol/L    Chloride 103 97 - 110 mmol/L    Carbon Dioxide 23 21 - 32 mmol/L    Anion Gap 10 7 - 19 mmol/L    Glucose 80 70 - 99 mg/dL    BUN 34 (H) 6 - 20 mg/dL    Creatinine 1.41 (H) 0.51 - 0.95 mg/dL    Glomerular Filtration Rate 45 (L) >=60    BUN/Cr 24 7 - 25    Calcium 9.3 8.4 - 10.2 mg/dL    Bilirubin, Total 0.6 0.2 - 1.0 mg/dL    GOT/AST 25 <=37 Units/L    GPT/ALT 29 <64 Units/L    Alkaline Phosphatase 212 (H) 45 - 117 Units/L    Albumin 3.8 3.6 - 5.1 g/dL    Protein, Total 8.8 (H) 6.4 - 8.2 g/dL    Globulin 5.0 (H) 2.0 - 4.0 g/dL    A/G Ratio 0.8 (L) 1.0 - 2.4   NT proBNP    Collection Time: 09/14/23 11:09 AM   Result Value Ref Range    NT-proBNP 3,979 (H) <=125 pg/mL   Magnesium    Collection Time: 09/14/23 11:09 AM   Result Value Ref Range    Magnesium 2.5 (H) 1.7 - 2.4 mg/dL   CBC with Automated Differential (performable only)    Collection Time: 09/14/23 11:09 AM   Result Value Ref Range    WBC 8.0 4.2 - 11.0 K/mcL    RBC 5.10 4.00 - 5.20 mil/mcL    HGB 12.8 12.0 - 15.5 g/dL    HCT 41.6 36.0 - 46.5 %    MCV 81.6 78.0 - 100.0 fl    MCH 25.1 (L) 26.0 - 34.0 pg     MCHC 30.8 (L) 32.0 - 36.5 g/dL    RDW-CV 19.9 (H) 11.0 - 15.0 %    RDW-SD 57.3 (H) 39.0 - 50.0 fL     140 - 450 K/mcL    NRBC 0 <=0 /100 WBC    Neutrophil, Percent 64 %    Lymphocytes, Percent 21 %    Mono, Percent 8 %    Eosinophils, Percent 5 %    Basophils, Percent 1 %    Immature Granulocytes 1 %    Absolute Neutrophils 5.1 1.8 - 7.7 K/mcL    Absolute Lymphocytes 1.7 1.0 - 4.0 K/mcL    Absolute Monocytes 0.6 0.3 - 0.9 K/mcL    Absolute Eosinophils  0.4 0.0 - 0.5 K/mcL    Absolute Basophils 0.1 0.0 - 0.3 K/mcL    Absolute Immature Granulocytes 0.0 0.0 - 0.2 K/mcL       Current Outpatient Medications   Medication Sig   • metOLazone (ZAROXOLYN) 2.5 MG tablet Take 1 tablet by mouth 2 days a week. Take 2.5mg ( 1 tab ) every Thursday and Sunday with extra Potassium 40mEq ( 2 tabs)   • torsemide (DEMADEX) 20 MG tablet Take 4 tablets by mouth 2 times daily.   • treprostinil (REMODULIN) IV infusion via CADD CUSTOM concentration (for CADD > 6 mg/100 mL) (adults) Inject 9,940 ng/min into the vein continuous. Follow dosing guide provided by the specialty pharmacy   • potassium CHLORIDE (KLOR-CON M) 20 MEQ sonja ER tablet Take 2 tablets by mouth in the morning and 2 tablets in the evening.   • midodrine (PROAMATINE) 10 MG tablet Take 1 tablet by mouth 3 times daily (with meals).   • ferrous sulfate 325 (65 FE) MG tablet Take 1 tablet by mouth in the morning and 1 tablet in the evening. Take with meals.   • loperamide (IMODIUM) 2 MG capsule Take 2 mg by mouth 4 times daily as needed for Diarrhea.   • Adempas 2.5 MG tablet TAKE 1 TABLET THREE TIMES A DAY   • triamcinolone (ARISTOCORT) 0.1 % cream Apply topically 2 times daily.   • oxygen (O2) gas Inhale 2 L/min into the lungs as needed.   • folic acid (FOLATE) 1 MG tablet Take 1 tablet by mouth daily. Do not start before February 3, 2021.     No current facility-administered medications for this visit.        HF Symptom Assessment:   • General: energy level good,  activity tolerance good  • Dizziness/lightheadedness: denies  • Shortness of breath: + walking \"so far\"  • Functional capacity: independent  • Cough: denies  • Chest pain: denies  • GI: abdomen soft/rounded, denies bloating, + poor appetite, + early satiety  • LE edema: none  • Orthopnea: denies  • PND: denies  • Sleep: good, sleeps in bed  • Nocturia: x2      HF Physical Assessment:  • Respiratory: lungs clear   • Cardiovascular:   o Telemetry:  NSR, HR 97  o LE edema:   o JVD:  +  o HJR:  +  • GI:  abdomen soft/rounded, denies bloating, + decreased appetite, + early satiety    Home Monitoring/Diet/Exercise:  • Daily weights: nonadherent, decreasing  • Sodium restriction: adherent  • Fluid restriction: adherent, 64 ounces    Supervising Provider:   LISS Ramos    Plan of Care Update:   Discussed labs/assessment with pt and LISS Mcknight.  Order received to increase home dose of torsemide to 100mg in the am and 80mg in the pm.  ( No iv push lasix today due to hypotension)    Educated pt on her disease process, importance of taking medications as directed and ramifications of non-compliance.  Instructed pt to take all of her meds as previously instructed and to not miss any doses.    Counseling/education provided at today’s visit:   How to contact CHF Clinic  Importance of medication adherence to improve heart function  Low sodium diet  Call clinic with any new symptoms of shortness of breath or leg swelling  Call clinic with weight gain of 3 lbs overnight of 5 lbs in one week  Call clinic with any new symptoms of light headedness or dizziness  Fluid restriction 64 ounces      Guideline Directed Medical Therapy for LVEF ? 40%   (NOTE: if not on, document the contraindication)    ARNI/ACEI/ARB: HFpEF    BB:  HFpEF  SGLT2-I:  Consider introducing  MRA (LVEF ? 35%):  HFpEF   Hydralazine/Nitrates for African Americans:  none  ICD (LVEF ? 35%) or CRT-D (LVEF ? 35% with QRS >150 and LBBB):  none  Anticoagulation for  AF/AFL:   none      Last iron panel:    6/2/2023  Venofer order/completion:   7/23/2023  Next iron panel due:         approx 9/23/2023      Next Clinic Appointment Date and Time:   9/26/23    Next Doctor's Appointment:   Dr. Talley/6min walk - 10/5/2023  Dr. Verma - 10/6/2023    Anticipated Labs for Next Appointment:   Bmp/mg/iron panel/ferritin   The patient is a 33y Female complaining of headache, migraine.

## 2024-02-08 ENCOUNTER — EMERGENCY (EMERGENCY)
Facility: HOSPITAL | Age: 39
LOS: 0 days | Discharge: HOME | End: 2024-02-08
Attending: STUDENT IN AN ORGANIZED HEALTH CARE EDUCATION/TRAINING PROGRAM
Payer: COMMERCIAL

## 2024-02-08 VITALS
HEART RATE: 92 BPM | RESPIRATION RATE: 18 BRPM | SYSTOLIC BLOOD PRESSURE: 132 MMHG | OXYGEN SATURATION: 99 % | WEIGHT: 154.1 LBS | DIASTOLIC BLOOD PRESSURE: 78 MMHG | TEMPERATURE: 99 F

## 2024-02-08 DIAGNOSIS — G43.909 MIGRAINE, UNSPECIFIED, NOT INTRACTABLE, WITHOUT STATUS MIGRAINOSUS: ICD-10-CM

## 2024-02-08 DIAGNOSIS — Z98.890 OTHER SPECIFIED POSTPROCEDURAL STATES: Chronic | ICD-10-CM

## 2024-02-08 DIAGNOSIS — R05.8 OTHER SPECIFIED COUGH: ICD-10-CM

## 2024-02-08 DIAGNOSIS — Z98.891 HISTORY OF UTERINE SCAR FROM PREVIOUS SURGERY: Chronic | ICD-10-CM

## 2024-02-08 DIAGNOSIS — R51.9 HEADACHE, UNSPECIFIED: ICD-10-CM

## 2024-02-08 LAB
ALBUMIN SERPL ELPH-MCNC: 4.5 G/DL — SIGNIFICANT CHANGE UP (ref 3.5–5.2)
ALP SERPL-CCNC: 97 U/L — SIGNIFICANT CHANGE UP (ref 30–115)
ALT FLD-CCNC: 22 U/L — SIGNIFICANT CHANGE UP (ref 0–41)
ANION GAP SERPL CALC-SCNC: 11 MMOL/L — SIGNIFICANT CHANGE UP (ref 7–14)
AST SERPL-CCNC: 27 U/L — SIGNIFICANT CHANGE UP (ref 0–41)
BASOPHILS # BLD AUTO: 0.01 K/UL — SIGNIFICANT CHANGE UP (ref 0–0.2)
BASOPHILS NFR BLD AUTO: 0.1 % — SIGNIFICANT CHANGE UP (ref 0–1)
BILIRUB SERPL-MCNC: 0.4 MG/DL — SIGNIFICANT CHANGE UP (ref 0.2–1.2)
BUN SERPL-MCNC: 14 MG/DL — SIGNIFICANT CHANGE UP (ref 10–20)
CALCIUM SERPL-MCNC: 9.6 MG/DL — SIGNIFICANT CHANGE UP (ref 8.4–10.5)
CHLORIDE SERPL-SCNC: 102 MMOL/L — SIGNIFICANT CHANGE UP (ref 98–110)
CO2 SERPL-SCNC: 24 MMOL/L — SIGNIFICANT CHANGE UP (ref 17–32)
CREAT SERPL-MCNC: 1 MG/DL — SIGNIFICANT CHANGE UP (ref 0.7–1.5)
EGFR: 73 ML/MIN/1.73M2 — SIGNIFICANT CHANGE UP
EOSINOPHIL # BLD AUTO: 0.04 K/UL — SIGNIFICANT CHANGE UP (ref 0–0.7)
EOSINOPHIL NFR BLD AUTO: 0.6 % — SIGNIFICANT CHANGE UP (ref 0–8)
GLUCOSE SERPL-MCNC: 99 MG/DL — SIGNIFICANT CHANGE UP (ref 70–99)
HCG SERPL QL: NEGATIVE — SIGNIFICANT CHANGE UP
HCT VFR BLD CALC: 40.1 % — SIGNIFICANT CHANGE UP (ref 37–47)
HGB BLD-MCNC: 13.5 G/DL — SIGNIFICANT CHANGE UP (ref 12–16)
IMM GRANULOCYTES NFR BLD AUTO: 0.1 % — SIGNIFICANT CHANGE UP (ref 0.1–0.3)
LYMPHOCYTES # BLD AUTO: 1.77 K/UL — SIGNIFICANT CHANGE UP (ref 1.2–3.4)
LYMPHOCYTES # BLD AUTO: 25.8 % — SIGNIFICANT CHANGE UP (ref 20.5–51.1)
MCHC RBC-ENTMCNC: 29.7 PG — SIGNIFICANT CHANGE UP (ref 27–31)
MCHC RBC-ENTMCNC: 33.7 G/DL — SIGNIFICANT CHANGE UP (ref 32–37)
MCV RBC AUTO: 88.1 FL — SIGNIFICANT CHANGE UP (ref 81–99)
MONOCYTES # BLD AUTO: 0.72 K/UL — HIGH (ref 0.1–0.6)
MONOCYTES NFR BLD AUTO: 10.5 % — HIGH (ref 1.7–9.3)
NEUTROPHILS # BLD AUTO: 4.31 K/UL — SIGNIFICANT CHANGE UP (ref 1.4–6.5)
NEUTROPHILS NFR BLD AUTO: 62.9 % — SIGNIFICANT CHANGE UP (ref 42.2–75.2)
NRBC # BLD: 0 /100 WBCS — SIGNIFICANT CHANGE UP (ref 0–0)
PLATELET # BLD AUTO: 246 K/UL — SIGNIFICANT CHANGE UP (ref 130–400)
PMV BLD: 11.4 FL — HIGH (ref 7.4–10.4)
POTASSIUM SERPL-MCNC: 4.7 MMOL/L — SIGNIFICANT CHANGE UP (ref 3.5–5)
POTASSIUM SERPL-SCNC: 4.7 MMOL/L — SIGNIFICANT CHANGE UP (ref 3.5–5)
PROT SERPL-MCNC: 7.4 G/DL — SIGNIFICANT CHANGE UP (ref 6–8)
RBC # BLD: 4.55 M/UL — SIGNIFICANT CHANGE UP (ref 4.2–5.4)
RBC # FLD: 13.3 % — SIGNIFICANT CHANGE UP (ref 11.5–14.5)
SODIUM SERPL-SCNC: 137 MMOL/L — SIGNIFICANT CHANGE UP (ref 135–146)
WBC # BLD: 6.86 K/UL — SIGNIFICANT CHANGE UP (ref 4.8–10.8)
WBC # FLD AUTO: 6.86 K/UL — SIGNIFICANT CHANGE UP (ref 4.8–10.8)

## 2024-02-08 PROCEDURE — 99284 EMERGENCY DEPT VISIT MOD MDM: CPT

## 2024-02-08 PROCEDURE — 36415 COLL VENOUS BLD VENIPUNCTURE: CPT

## 2024-02-08 PROCEDURE — 99284 EMERGENCY DEPT VISIT MOD MDM: CPT | Mod: 25

## 2024-02-08 PROCEDURE — 84703 CHORIONIC GONADOTROPIN ASSAY: CPT

## 2024-02-08 PROCEDURE — 96375 TX/PRO/DX INJ NEW DRUG ADDON: CPT

## 2024-02-08 PROCEDURE — 96374 THER/PROPH/DIAG INJ IV PUSH: CPT

## 2024-02-08 PROCEDURE — 80053 COMPREHEN METABOLIC PANEL: CPT

## 2024-02-08 PROCEDURE — 85025 COMPLETE CBC W/AUTO DIFF WBC: CPT

## 2024-02-08 RX ORDER — METOCLOPRAMIDE HCL 10 MG
10 TABLET ORAL ONCE
Refills: 0 | Status: COMPLETED | OUTPATIENT
Start: 2024-02-08 | End: 2024-02-08

## 2024-02-08 RX ORDER — METOCLOPRAMIDE HCL 10 MG
1 TABLET ORAL
Qty: 15 | Refills: 0
Start: 2024-02-08 | End: 2024-02-12

## 2024-02-08 RX ORDER — SODIUM CHLORIDE 9 MG/ML
1000 INJECTION INTRAMUSCULAR; INTRAVENOUS; SUBCUTANEOUS ONCE
Refills: 0 | Status: COMPLETED | OUTPATIENT
Start: 2024-02-08 | End: 2024-02-08

## 2024-02-08 RX ORDER — ACETAMINOPHEN 500 MG
1 TABLET ORAL
Qty: 20 | Refills: 0
Start: 2024-02-08 | End: 2024-02-12

## 2024-02-08 RX ORDER — KETOROLAC TROMETHAMINE 30 MG/ML
15 SYRINGE (ML) INJECTION ONCE
Refills: 0 | Status: DISCONTINUED | OUTPATIENT
Start: 2024-02-08 | End: 2024-02-08

## 2024-02-08 RX ADMIN — Medication 10 MILLIGRAM(S): at 17:49

## 2024-02-08 RX ADMIN — SODIUM CHLORIDE 1000 MILLILITER(S): 9 INJECTION INTRAMUSCULAR; INTRAVENOUS; SUBCUTANEOUS at 17:50

## 2024-02-08 RX ADMIN — Medication 15 MILLIGRAM(S): at 17:50

## 2024-02-08 NOTE — ED PROVIDER NOTE - ATTENDING CONTRIBUTION TO CARE
38-year-old female past medical history of migraine headaches presenting for evaluation of intermittent headache for the last 10 days. headaches intermittent, bitemporal.  no fevers, neck pain, vision changes, trauma, change in hearing, vertigo, cp, sob.  pt endorses feeling some chills at night. daughter in  w/ some uri sx. pt developed dry cough yesterday.    vss  gen- NAD, aaox3  card-rrr  lungs-ctab, no wheezing or rhonchi  abd-sntnd, no guarding or rebound  neuro- full str/sensation, cn ii-xii grossly intact, normal coordination and gait, neck supple, no meningismus

## 2024-02-08 NOTE — ED PROVIDER NOTE - CLINICAL SUMMARY MEDICAL DECISION MAKING FREE TEXT BOX
Throughout ED observation period, pt remained clinically and hemodynamically stable.  labs w/o acute findings  sx improving w/ migraine cocktail  no e/o acute bacterial infection, no reported trauma, no fnd to warrant imaging at this point. will rec continued home care, neuro f/u, return precautions

## 2024-02-08 NOTE — ED PROVIDER NOTE - PROGRESS NOTE DETAILS
MS–patient feeling much better.  Still has mild left temporal headache.  But feels comfortable going home and requesting to leave.  Will send home with Tylenol prescription.

## 2024-02-08 NOTE — ED PROVIDER NOTE - OBJECTIVE STATEMENT
Patient is a 38-year-old female past medical history of migraine headaches presenting for evaluation of intermittent headache for the last 10 days.  Patient states it comes and goes and usually affects bilateral temples.  Patient states that she has been waking up with chills.  Denies any fevers, nausea, vomiting, vision changes, ear ringing, room spinning dizziness, chest pain, shortness of breath.  Patient daughter goes to  and is sick with URI.  Patient began coughing yesterday.

## 2024-02-08 NOTE — ED PROVIDER NOTE - NSFOLLOWUPCLINICS_GEN_ALL_ED_FT
[facility]  Neurology  [address] [place] [address], [address]  [place] [address]  Phone: [phone]  Fax:

## 2024-02-08 NOTE — ED PROVIDER NOTE - PHYSICAL EXAMINATION
VITAL SIGNS: I have reviewed nursing notes and confirm.  CONSTITUTIONAL: well-appearing, non-toxic, NAD  SKIN: Warm dry, normal skin turgor  HEAD: NCAT  EYES: EOMI, PERRLA, no scleral icterus  ENT: Moist mucous membranes, normal pharynx with no erythema or exudates. No nystagmus.  NECK: Supple; non tender. Full ROM. No midline tenderness  CARD: RRR, no murmurs, rubs or gallops  RESP: clear to ausculation b/l.  No rales, rhonchi, or wheezing.  ABD: soft, + BS, non-tender, non-distended, no rebound or guarding. No CVA tenderness  EXT: Full ROM, no pedal edema, no calf tenderness  NEURO: normal motor. normal sensory. CN II-XII intact.  Normal gait.  PSYCH: Cooperative, appropriate.

## 2024-02-08 NOTE — ED PROVIDER NOTE - PATIENT PORTAL LINK FT
You can access the FollowMyHealth Patient Portal offered by [facility] by registering at the following website: [email or web address]. By joining [employer]’s FollowMyHealth portal, you will also be able to view your health information using other applications (apps) compatible with our system.

## 2025-02-24 NOTE — OB RN DELIVERY SUMMARY - NS_BREASTACTIONA_OBGYN_ALL_OB
Case Management Discharge Planning Note    Patient name Lisa Davidson Jr.  Location /409-01 MRN [id number]  : 1974 Date 2025       Current Admission Date: 2025  Current Admission Diagnosis:Suspected venous thromboembolism (VTE)   Patient Active Problem List    Diagnosis Date Noted Date Diagnosed    Encephalomalacia on imaging study 2025     Suspected venous thromboembolism (VTE) 2025     Acute gastroenteritis 2025     Emphysematous COPD (HCC) 2025     Chest pain on exertion 2025     SKYLER (obstructive sleep apnea)      Cigarette nicotine dependence without complication 2021     Obesity (BMI 30-39.9) 2021     Tremor 2020     Cavitary lesion of lung 2020     Microscopic hematuria 2020     Pleural effusion on left 2020     Tobacco use 2020       LOS (days): 1  Geometric Mean LOS (GMLOS) (days):   Days to GMLOS:     OBJECTIVE:  Risk of Unplanned Readmission Score: 9.94         Current admission status: Inpatient   Preferred Pharmacy:   [facility] [address] [email or web address] [facility]   [facility] [facility] [address]  Phone: [phone] Fax: [phone]    [facility] - [place] [address] [place] [facility] [address]  Phone: [phone] Fax: [phone]    Primary Care Provider: Cecilia Leavitt MD    Primary Insurance: COMMERCIAL MISCELLANEOUS  Secondary Insurance:     DISCHARGE DETAILS:  Pt is being dc'd home on this date with OP follow up after dc and ambulatory referral for Op Pulmonary rehab.                                                                                                                        No action was needed

## 2025-04-30 NOTE — OB RN PATIENT PROFILE - POST PARTUM DEPRESSION SCREEN OB 5
Assessment/Plan:  Patient is with a posterior midline anal fissure, grade 1-2 internal hemorrhoids without evidence of bleeding.    Plan:    Patient advised in conservative management of anal fissure.    Prescription called into [facility] for Cardizem 2%    Patient will follow-up in 1 month's time for recheck of anal fissure for healing.  Patient involved with nonhealing occurred she should consider surgery in form of internal lateral sphincterotomy       Diagnoses and all orders for this visit:    Anal fissure    Rectal bleeding  -     Ambulatory Referral to Colorectal Surgery  -     Lower Endoscopy    Anal pain    Irritable bowel syndrome with constipation    Prolapsed internal hemorrhoids, grade 2    Other orders  -     Cancel: Anal Procedures          Lower Endoscopy    Date/Time: 4/29/2025 4:00 PM    Performed by: Cooper [place] MD  Authorized by: Cooper [place] MD    Verbal consent obtained?: Yes    Written consent obtained?: No    Risks and benefits: Risks, benefits and alternatives were discussed    Consent given by:  Patient  Patient states understanding of procedure being performed: Yes    Patient identity confirmed:  Verbally with patient  Indications: rectal bleeding    Scope type:  Anoscope  External exam performed: Yes    Perianal skin tags: Yes    Digital exam performed: Yes    Laxity of anal sphincter: No    Internal hemorrhoids: Yes (Grade 1-2 internal hemorrhoids)    Prolapsed: No    Intraluminal mass: No    Inflammation: No    Anal fissures: Yes    Anal fissure position:  Six o'clock  Anal fistulae: No    Anal stricture: No    Abscess: No    Procedure termination:  Procedure complete  Patient tolerance:  Patient tolerated the procedure well with no immediate complications      Subjective:      Patient ID: Heidi Wheatley is a 46 y.o. female.    HPI  Patient presents with anal rectal bleeding with defecation.  The patient's defecation is painful.  She describes defecation as the  equivalent of as passing razor blades, with anal discomfort sometimes a few hours after completion of defecation.  Patient's colonoscopy completed 2022 completed by Dr. Calles reviewed.  Impression: All observed locations appeared normal, including the terminal ileum, ileocecal valve, cecum, ascending colon, hepatic flexure, transverse colon, splenic flexure, descending colon, sigmoid colon, rectosigmoid and rectum.  Internal hemorrhoids; no bleeding was identified  The following portions of the patient's history were reviewed and updated as appropriate:   She  has a past medical history of COVID-19 virus infection (3/19/2021), Headache(784.0), Migraine, Nasal turbinate hypertrophy, Tonsillitis, chronic, and Wears glasses.  She   Patient Active Problem List    Diagnosis Date Noted    Chills (without fever) 2024    Generalized body aches 2024    Chest congestion 2024    Nasal congestion 2024    Hematochezia 2024    Fibroids, submucosal 10/17/2023    Menorrhagia with irregular cycle 2023    Acute pain of left knee 2023    Abscess of axilla, right 2022    Acute midline low back pain without sciatica 2021    Impaired fasting glucose 2021    Hemorrhoids, external 2021    S/P tonsillectomy 2020    Plantar fasciitis of left foot 2019    Hypertrophy of both inferior nasal turbinates 10/28/2019    Prediabetes 2018    Irritable bowel syndrome with constipation 10/03/2017     She  has a past surgical history that includes  section; Appendectomy; Tubal ligation; TONSILLECTOMY (Bilateral, 2019); Turbinate resection (N/A, 2019); Mammo stereotactic breast biopsy left (all inc) (Left, 2020); Breast biopsy (Left, 2020); Colonoscopy; pr hysteroscopy bx endometrium&/polypc w/wo d&c (N/A, 10/17/2023); pr hysteroscopy endometrial ablation (N/A, 10/17/2023); and Myomectomy (N/A, 10/17/2023).  Her family history includes  Breast cancer (age of onset: 45) in her maternal aunt; Cancer in her family and maternal grandmother; No Known Problems in her daughter, daughter, father, maternal aunt, maternal aunt, maternal grandfather, mother, paternal grandfather, and paternal grandmother.  She  reports that she has never smoked. She has never used smokeless tobacco. She reports current alcohol use of about 1.0 - 2.0 standard drink of alcohol per week. She reports that she does not use drugs.  Current Outpatient Medications   Medication Sig Dispense Refill    linaCLOtide (Linzess) 72 MCG CAPS Take 72 mcg by mouth in the morning 90 capsule 2    naproxen (NAPROSYN) 500 mg tablet Take 1 tablet (500 mg total) by mouth 2 (two) times a day as needed for moderate pain for up to 20 doses 20 tablet 0    triamcinolone (KENALOG) 0.5 % ointment Apply topically 2 (two) times a day 15 g 2    vitamin B-12 (VITAMIN B-12) 1,000 mcg tablet Take 1 tablet (1,000 mcg total) by mouth daily 90 tablet 0    Vitamin D, Ergocalciferol, 92983 units CAPS Take 1 capsule by mouth once a week 12 capsule 0    nystatin (MYCOSTATIN) ointment Apply topically 2 (two) times a day for 28 days (Patient taking differently: Apply topically if needed prn) 30 g 1     No current facility-administered medications for this visit.     Current Outpatient Medications on File Prior to Visit   Medication Sig    linaCLOtide (Linzess) 72 MCG CAPS Take 72 mcg by mouth in the morning    naproxen (NAPROSYN) 500 mg tablet Take 1 tablet (500 mg total) by mouth 2 (two) times a day as needed for moderate pain for up to 20 doses    triamcinolone (KENALOG) 0.5 % ointment Apply topically 2 (two) times a day    vitamin B-12 (VITAMIN B-12) 1,000 mcg tablet Take 1 tablet (1,000 mcg total) by mouth daily    Vitamin D, Ergocalciferol, 06504 units CAPS Take 1 capsule by mouth once a week    nystatin (MYCOSTATIN) ointment Apply topically 2 (two) times a day for 28 days (Patient taking differently: Apply topically  "if needed prn)     No current facility-administered medications on file prior to visit.     She has no known allergies..    Review of Systems   Constitutional: Negative.    Gastrointestinal:  Positive for anal bleeding. Negative for blood in stool and rectal pain.         Objective:      /82   Pulse 85   Ht 5' 6\" (1.676 m)   Wt 109 kg (241 lb)   LMP 10/14/2023 (Exact Date)   SpO2 98%   BMI 38.90 kg/m²          Physical Exam   Constitutional: She is oriented to person, place, and time.   Abdominal: Normal appearance.   Neurological: She is alert and oriented to person, place, and time.   Psychiatric: Her behavior is normal.   Nursing note and vitals reviewed.      Colorectal exam  See procedure note  " no

## 2025-06-11 NOTE — ED PROVIDER NOTE - TEMPLATE, MLM
CC:  Peter Nagel is here today for Chronic Kidney Disease       Medications: medications verified, no change      Latex allergy or sensitivity: No known latex allergy     Patient would like communication of their results via:    Cell Phone:   Telephone Information:   Mobile [phone]     Okay to leave a message containing results? Yes    Patient's current [facility] status: Active.   General Headache (Tension)

## 2025-09-09 ENCOUNTER — EMERGENCY (EMERGENCY)
Facility: HOSPITAL | Age: 40
LOS: 0 days | Discharge: HOME | End: 2025-09-09
Attending: EMERGENCY MEDICINE
Payer: COMMERCIAL

## 2025-09-09 ENCOUNTER — NON-APPOINTMENT (OUTPATIENT)
Age: 40
End: 2025-09-09

## 2025-09-09 VITALS
SYSTOLIC BLOOD PRESSURE: 119 MMHG | HEIGHT: 64 IN | WEIGHT: 149.91 LBS | RESPIRATION RATE: 18 BRPM | TEMPERATURE: 98 F | HEART RATE: 89 BPM | DIASTOLIC BLOOD PRESSURE: 80 MMHG | OXYGEN SATURATION: 99 %

## 2025-09-09 DIAGNOSIS — Z98.891 HISTORY OF UTERINE SCAR FROM PREVIOUS SURGERY: Chronic | ICD-10-CM

## 2025-09-09 DIAGNOSIS — Z98.890 OTHER SPECIFIED POSTPROCEDURAL STATES: Chronic | ICD-10-CM

## 2025-09-09 DIAGNOSIS — R10.31 RIGHT LOWER QUADRANT PAIN: ICD-10-CM

## 2025-09-09 DIAGNOSIS — N83.201 UNSPECIFIED OVARIAN CYST, RIGHT SIDE: ICD-10-CM

## 2025-09-09 LAB
ALBUMIN SERPL ELPH-MCNC: 4.5 G/DL — SIGNIFICANT CHANGE UP (ref 3.5–5.2)
ALP SERPL-CCNC: 97 U/L — SIGNIFICANT CHANGE UP (ref 30–115)
ALT FLD-CCNC: 23 U/L — SIGNIFICANT CHANGE UP (ref 0–41)
ANION GAP SERPL CALC-SCNC: 11 MMOL/L — SIGNIFICANT CHANGE UP (ref 7–14)
APPEARANCE UR: CLEAR — SIGNIFICANT CHANGE UP
AST SERPL-CCNC: 21 U/L — SIGNIFICANT CHANGE UP (ref 0–41)
BASOPHILS # BLD AUTO: 0.02 K/UL — SIGNIFICANT CHANGE UP (ref 0–0.2)
BASOPHILS NFR BLD AUTO: 0.4 % — SIGNIFICANT CHANGE UP (ref 0–2)
BILIRUB SERPL-MCNC: 0.5 MG/DL — SIGNIFICANT CHANGE UP (ref 0.2–1.2)
BILIRUB UR-MCNC: NEGATIVE — SIGNIFICANT CHANGE UP
BUN SERPL-MCNC: 9 MG/DL — LOW (ref 10–20)
CALCIUM SERPL-MCNC: 9.3 MG/DL — SIGNIFICANT CHANGE UP (ref 8.4–10.5)
CHLORIDE SERPL-SCNC: 104 MMOL/L — SIGNIFICANT CHANGE UP (ref 98–110)
CO2 SERPL-SCNC: 23 MMOL/L — SIGNIFICANT CHANGE UP (ref 17–32)
COLOR SPEC: YELLOW — SIGNIFICANT CHANGE UP
CREAT SERPL-MCNC: 0.7 MG/DL — SIGNIFICANT CHANGE UP (ref 0.7–1.5)
DIFF PNL FLD: NEGATIVE — SIGNIFICANT CHANGE UP
EGFR: 111 ML/MIN/1.73M2 — SIGNIFICANT CHANGE UP
EGFR: 111 ML/MIN/1.73M2 — SIGNIFICANT CHANGE UP
EOSINOPHIL # BLD AUTO: 0.08 K/UL — SIGNIFICANT CHANGE UP (ref 0–0.5)
EOSINOPHIL NFR BLD AUTO: 1.8 % — SIGNIFICANT CHANGE UP (ref 0–6)
GLUCOSE SERPL-MCNC: 87 MG/DL — SIGNIFICANT CHANGE UP (ref 70–99)
GLUCOSE UR QL: NEGATIVE MG/DL — SIGNIFICANT CHANGE UP
HCT VFR BLD CALC: 39.1 % — SIGNIFICANT CHANGE UP (ref 34.5–45)
HGB BLD-MCNC: 12.7 G/DL — SIGNIFICANT CHANGE UP (ref 11.5–15.5)
IMM GRANULOCYTES # BLD AUTO: 0.01 K/UL — SIGNIFICANT CHANGE UP (ref 0–0.07)
IMM GRANULOCYTES NFR BLD AUTO: 0.2 % — SIGNIFICANT CHANGE UP (ref 0–0.9)
KETONES UR QL: NEGATIVE MG/DL — SIGNIFICANT CHANGE UP
LEUKOCYTE ESTERASE UR-ACNC: NEGATIVE — SIGNIFICANT CHANGE UP
LYMPHOCYTES # BLD AUTO: 2.3 K/UL — SIGNIFICANT CHANGE UP (ref 1–3.3)
LYMPHOCYTES NFR BLD AUTO: 51.5 % — HIGH (ref 13–44)
MCHC RBC-ENTMCNC: 28.1 PG — SIGNIFICANT CHANGE UP (ref 27–34)
MCHC RBC-ENTMCNC: 32.5 G/DL — SIGNIFICANT CHANGE UP (ref 32–36)
MCV RBC AUTO: 86.5 FL — SIGNIFICANT CHANGE UP (ref 80–100)
MONOCYTES # BLD AUTO: 0.5 K/UL — SIGNIFICANT CHANGE UP (ref 0–0.9)
MONOCYTES NFR BLD AUTO: 11.2 % — SIGNIFICANT CHANGE UP (ref 2–14)
NEUTROPHILS # BLD AUTO: 1.56 K/UL — LOW (ref 1.8–7.4)
NEUTROPHILS NFR BLD AUTO: 34.9 % — LOW (ref 43–77)
NITRITE UR-MCNC: NEGATIVE — SIGNIFICANT CHANGE UP
NRBC # BLD AUTO: 0 K/UL — SIGNIFICANT CHANGE UP (ref 0–0)
NRBC # FLD: 0 K/UL — SIGNIFICANT CHANGE UP (ref 0–0)
NRBC BLD AUTO-RTO: 0 /100 WBCS — SIGNIFICANT CHANGE UP (ref 0–0)
PH UR: 7 — SIGNIFICANT CHANGE UP (ref 5–8)
PLATELET # BLD AUTO: 247 K/UL — SIGNIFICANT CHANGE UP (ref 150–400)
PMV BLD: 10.7 FL — SIGNIFICANT CHANGE UP (ref 7–13)
POTASSIUM SERPL-MCNC: 4.3 MMOL/L — SIGNIFICANT CHANGE UP (ref 3.5–5)
POTASSIUM SERPL-SCNC: 4.3 MMOL/L — SIGNIFICANT CHANGE UP (ref 3.5–5)
PROT SERPL-MCNC: 7.2 G/DL — SIGNIFICANT CHANGE UP (ref 6–8)
PROT UR-MCNC: SIGNIFICANT CHANGE UP MG/DL
RBC # BLD: 4.52 M/UL — SIGNIFICANT CHANGE UP (ref 3.8–5.2)
RBC # FLD: 13.2 % — SIGNIFICANT CHANGE UP (ref 10.3–14.5)
SODIUM SERPL-SCNC: 138 MMOL/L — SIGNIFICANT CHANGE UP (ref 135–146)
SP GR SPEC: 1.02 — SIGNIFICANT CHANGE UP (ref 1–1.03)
UROBILINOGEN FLD QL: 0.2 MG/DL — SIGNIFICANT CHANGE UP (ref 0.2–1)
WBC # BLD: 4.47 K/UL — SIGNIFICANT CHANGE UP (ref 3.8–10.5)
WBC # FLD AUTO: 4.47 K/UL — SIGNIFICANT CHANGE UP (ref 3.8–10.5)

## 2025-09-09 PROCEDURE — 76830 TRANSVAGINAL US NON-OB: CPT

## 2025-09-09 PROCEDURE — 99284 EMERGENCY DEPT VISIT MOD MDM: CPT | Mod: 25

## 2025-09-09 PROCEDURE — 85025 COMPLETE CBC W/AUTO DIFF WBC: CPT

## 2025-09-09 PROCEDURE — 74177 CT ABD & PELVIS W/CONTRAST: CPT | Mod: 26

## 2025-09-09 PROCEDURE — 96361 HYDRATE IV INFUSION ADD-ON: CPT

## 2025-09-09 PROCEDURE — 80053 COMPREHEN METABOLIC PANEL: CPT

## 2025-09-09 PROCEDURE — 74177 CT ABD & PELVIS W/CONTRAST: CPT

## 2025-09-09 PROCEDURE — 81003 URINALYSIS AUTO W/O SCOPE: CPT

## 2025-09-09 PROCEDURE — 99285 EMERGENCY DEPT VISIT HI MDM: CPT

## 2025-09-09 PROCEDURE — 76830 TRANSVAGINAL US NON-OB: CPT | Mod: 26

## 2025-09-09 PROCEDURE — 96360 HYDRATION IV INFUSION INIT: CPT | Mod: XU

## 2025-09-09 RX ORDER — SODIUM CHLORIDE 9 G/1000ML
1000 INJECTION, SOLUTION INTRAVENOUS ONCE
Refills: 0 | Status: COMPLETED | OUTPATIENT
Start: 2025-09-09 | End: 2025-09-09

## 2025-09-09 RX ADMIN — SODIUM CHLORIDE 1000 MILLILITER(S): 9 INJECTION, SOLUTION INTRAVENOUS at 11:30

## 2025-09-09 RX ADMIN — SODIUM CHLORIDE 1000 MILLILITER(S): 9 INJECTION, SOLUTION INTRAVENOUS at 13:22
